# Patient Record
Sex: FEMALE | Race: ASIAN | Employment: FULL TIME | URBAN - METROPOLITAN AREA
[De-identification: names, ages, dates, MRNs, and addresses within clinical notes are randomized per-mention and may not be internally consistent; named-entity substitution may affect disease eponyms.]

---

## 2019-04-08 ENCOUNTER — APPOINTMENT (OUTPATIENT)
Dept: ULTRASOUND IMAGING | Facility: HOSPITAL | Age: 34
End: 2019-04-08
Attending: NURSE PRACTITIONER
Payer: COMMERCIAL

## 2019-04-08 ENCOUNTER — HOSPITAL ENCOUNTER (EMERGENCY)
Facility: HOSPITAL | Age: 34
Discharge: HOME | End: 2019-04-08
Payer: COMMERCIAL

## 2019-04-08 VITALS
RESPIRATION RATE: 17 BRPM | WEIGHT: 160 LBS | DIASTOLIC BLOOD PRESSURE: 76 MMHG | HEIGHT: 63 IN | TEMPERATURE: 99 F | BODY MASS INDEX: 28.35 KG/M2 | SYSTOLIC BLOOD PRESSURE: 135 MMHG | OXYGEN SATURATION: 97 % | HEART RATE: 117 BPM

## 2019-04-08 DIAGNOSIS — K52.9 GASTROENTERITIS: Primary | ICD-10-CM

## 2019-04-08 DIAGNOSIS — O21.9 NAUSEA AND VOMITING IN PREGNANCY: ICD-10-CM

## 2019-04-08 PROCEDURE — 96375 TX/PRO/DX INJ NEW DRUG ADDON: CPT

## 2019-04-08 PROCEDURE — 96376 TX/PRO/DX INJ SAME DRUG ADON: CPT

## 2019-04-08 PROCEDURE — 83690 ASSAY OF LIPASE: CPT | Performed by: NURSE PRACTITIONER

## 2019-04-08 PROCEDURE — 81001 URINALYSIS AUTO W/SCOPE: CPT

## 2019-04-08 PROCEDURE — 80048 BASIC METABOLIC PNL TOTAL CA: CPT

## 2019-04-08 PROCEDURE — 85025 COMPLETE CBC W/AUTO DIFF WBC: CPT

## 2019-04-08 PROCEDURE — 96361 HYDRATE IV INFUSION ADD-ON: CPT

## 2019-04-08 PROCEDURE — 99285 EMERGENCY DEPT VISIT HI MDM: CPT

## 2019-04-08 PROCEDURE — 80076 HEPATIC FUNCTION PANEL: CPT | Performed by: NURSE PRACTITIONER

## 2019-04-08 PROCEDURE — 96374 THER/PROPH/DIAG INJ IV PUSH: CPT

## 2019-04-08 PROCEDURE — 76705 ECHO EXAM OF ABDOMEN: CPT | Performed by: NURSE PRACTITIONER

## 2019-04-08 RX ORDER — METOCLOPRAMIDE 10 MG/1
10 TABLET ORAL 3 TIMES DAILY PRN
Qty: 15 TABLET | Refills: 0 | Status: SHIPPED | OUTPATIENT
Start: 2019-04-08 | End: 2019-04-15

## 2019-04-08 RX ORDER — ONDANSETRON 2 MG/ML
4 INJECTION INTRAMUSCULAR; INTRAVENOUS ONCE
Status: COMPLETED | OUTPATIENT
Start: 2019-04-08 | End: 2019-04-08

## 2019-04-08 RX ORDER — METOCLOPRAMIDE HYDROCHLORIDE 5 MG/ML
10 INJECTION INTRAMUSCULAR; INTRAVENOUS ONCE
Status: COMPLETED | OUTPATIENT
Start: 2019-04-08 | End: 2019-04-08

## 2019-04-08 RX ORDER — DIPHENHYDRAMINE HYDROCHLORIDE 50 MG/ML
25 INJECTION INTRAMUSCULAR; INTRAVENOUS ONCE
Status: COMPLETED | OUTPATIENT
Start: 2019-04-08 | End: 2019-04-08

## 2019-04-08 RX ORDER — ONDANSETRON 4 MG/1
4 TABLET, ORALLY DISINTEGRATING ORAL EVERY 6 HOURS PRN
Qty: 15 TABLET | Refills: 0 | Status: SHIPPED | OUTPATIENT
Start: 2019-04-08 | End: 2019-04-15

## 2019-04-08 NOTE — ED INITIAL ASSESSMENT (HPI)
PT came in for N/V/D since this morning. 12 weeks pregnant, G2, P1. Denies abdominal pain ,bleeding or discharge. RR even and nonlabored, speaking in full sentences, ambulatory with steady gait.

## 2019-04-08 NOTE — ED PROVIDER NOTES
Patient Seen in: [facility] AND [facility] Emergency Department    History   CC: abd pain  HPI: Ruma Nair 35year old female  who presents to the ER c/o n/v/d and upper abd pain today. Unable to tolerate any PO today. 3 episodes of loose stool w/o blood.  Misa Castillo edema  Neck - supple with trachea midline  Resp - Lung sounds clear bilaterally and wob unlabored, good aeration with equal, even expansion bilaterally   CV - RRR  GI - Appears round and flat, BS +x4 quadrants, +RUQ, epigastric, and LUQ tenderness w/o guar DIFFERENTIAL[[id number]]          Abnormal            Final result                 Please view results for these tests on the individual orders.    RAINBOW DRAW BLUE   RAINBOW DRAW LAVENDER   RAINBOW DRAW LIGHT GREEN   RAINBOW DRAW GOLD       [facility]      gall

## 2019-06-04 ENCOUNTER — HOSPITAL ENCOUNTER (OUTPATIENT)
Age: 34
Discharge: HOME | End: 2019-06-04
Attending: FAMILY MEDICINE
Payer: COMMERCIAL

## 2019-06-04 VITALS
WEIGHT: 170 LBS | OXYGEN SATURATION: 100 % | HEART RATE: 120 BPM | TEMPERATURE: 100 F | RESPIRATION RATE: 18 BRPM | HEIGHT: 63 IN | SYSTOLIC BLOOD PRESSURE: 128 MMHG | DIASTOLIC BLOOD PRESSURE: 84 MMHG | BODY MASS INDEX: 30.12 KG/M2

## 2019-06-04 DIAGNOSIS — J02.9 PHARYNGITIS, UNSPECIFIED ETIOLOGY: Primary | ICD-10-CM

## 2019-06-04 PROCEDURE — 99213 OFFICE O/P EST LOW 20 MIN: CPT

## 2019-06-04 PROCEDURE — 87430 STREP A AG IA: CPT

## 2019-06-04 PROCEDURE — 87081 CULTURE SCREEN ONLY: CPT | Performed by: FAMILY MEDICINE

## 2019-06-04 PROCEDURE — 87502 INFLUENZA DNA AMP PROBE: CPT | Performed by: FAMILY MEDICINE

## 2019-06-04 PROCEDURE — 99214 OFFICE O/P EST MOD 30 MIN: CPT

## 2019-06-04 RX ORDER — DOXYLAMINE SUCCINATE AND PYRIDOXINE HYDROCHLORIDE, DELAYED RELEASE TABLETS 10 MG/10 MG 10; 10 MG/1; MG/1
2 TABLET, DELAYED RELEASE ORAL NIGHTLY
COMMUNITY
End: 2021-05-21

## 2019-06-04 RX ORDER — RANITIDINE 150 MG/1
150 TABLET ORAL 2 TIMES DAILY
COMMUNITY
End: 2021-05-21

## 2019-06-04 NOTE — ED INITIAL ASSESSMENT (HPI)
PT is 19 weeks pregnant. Pt states having a sore throat that began last night with a fever. Pt states having difficulty swallowing. Pt states having a fever that staying in the 100s even with tylenol.

## 2019-06-04 NOTE — ED PROVIDER NOTES
Patient Seen in: [address]    History   Patient presents with:  Sore Throat    Stated Complaint: sore throat, fever    HPI  32yo  at 19wga presents to IC with 1-2 days of a sore throat and fever.  Has been taking Tyl in the jaw. No uvula swelling. Posterior oropharyngeal erythema (mild posterior pharyngeal injection) present. No oropharyngeal exudate, posterior oropharyngeal edema or tonsillar abscesses. Tonsils are 0 on the right. Tonsils are 0 on the left.  No tonsill [address]  [phone]    Schedule an appointment as soon as possible for a visit in 3 days  If no improvement or return to clinic for new or worse symptoms        Medications Prescribed:  Current Discharge Medication List

## 2021-05-21 ENCOUNTER — TELEPHONE (OUTPATIENT)
Dept: PHYSICAL THERAPY | Facility: HOSPITAL | Age: 36
End: 2021-05-21

## 2021-05-21 ENCOUNTER — HOSPITAL ENCOUNTER (OUTPATIENT)
Dept: GENERAL RADIOLOGY | Facility: HOSPITAL | Age: 36
Discharge: HOME | End: 2021-05-21
Attending: PHYSICAL MEDICINE & REHABILITATION
Payer: COMMERCIAL

## 2021-05-21 ENCOUNTER — OFFICE VISIT (OUTPATIENT)
Dept: NEUROLOGY | Facility: CLINIC | Age: 36
End: 2021-05-21
Payer: COMMERCIAL

## 2021-05-21 ENCOUNTER — TELEPHONE (OUTPATIENT)
Dept: NEUROLOGY | Facility: CLINIC | Age: 36
End: 2021-05-21

## 2021-05-21 VITALS
OXYGEN SATURATION: 96 % | DIASTOLIC BLOOD PRESSURE: 78 MMHG | WEIGHT: 160 LBS | SYSTOLIC BLOOD PRESSURE: 112 MMHG | HEART RATE: 92 BPM | BODY MASS INDEX: 28.35 KG/M2 | HEIGHT: 63 IN

## 2021-05-21 DIAGNOSIS — M70.61 GREATER TROCHANTERIC BURSITIS OF BOTH HIPS: ICD-10-CM

## 2021-05-21 DIAGNOSIS — M70.62 GREATER TROCHANTERIC BURSITIS OF BOTH HIPS: Primary | ICD-10-CM

## 2021-05-21 DIAGNOSIS — M51.16 LUMBAR DISC HERNIATION WITH RADICULOPATHY: ICD-10-CM

## 2021-05-21 DIAGNOSIS — S76.019A STRAIN OF GLUTEUS MEDIUS, UNSPECIFIED LATERALITY, INITIAL ENCOUNTER: ICD-10-CM

## 2021-05-21 DIAGNOSIS — T39.395A NSAID INDUCED GASTRITIS: ICD-10-CM

## 2021-05-21 DIAGNOSIS — M54.59 MECHANICAL LOW BACK PAIN: ICD-10-CM

## 2021-05-21 DIAGNOSIS — M22.2X9 PATELLOFEMORAL PAIN SYNDROME, UNSPECIFIED LATERALITY: ICD-10-CM

## 2021-05-21 DIAGNOSIS — M76.31 ILIOTIBIAL BAND SYNDROME OF RIGHT SIDE: ICD-10-CM

## 2021-05-21 DIAGNOSIS — M47.816 LUMBAR SPONDYLOSIS: ICD-10-CM

## 2021-05-21 DIAGNOSIS — K29.60 NSAID INDUCED GASTRITIS: ICD-10-CM

## 2021-05-21 DIAGNOSIS — M70.62 GREATER TROCHANTERIC BURSITIS OF BOTH HIPS: ICD-10-CM

## 2021-05-21 DIAGNOSIS — M70.61 GREATER TROCHANTERIC BURSITIS OF BOTH HIPS: Primary | ICD-10-CM

## 2021-05-21 PROCEDURE — 72110 X-RAY EXAM L-2 SPINE 4/>VWS: CPT | Performed by: PHYSICAL MEDICINE & REHABILITATION

## 2021-05-21 PROCEDURE — 73564 X-RAY EXAM KNEE 4 OR MORE: CPT | Performed by: PHYSICAL MEDICINE & REHABILITATION

## 2021-05-21 RX ORDER — SPIRONOLACTONE 50 MG/1
1 TABLET, FILM COATED ORAL DAILY
COMMUNITY
Start: 2021-03-11

## 2021-05-21 NOTE — H&P
[address] H&P    Requesting Physician: Ariana Flores    Chief Complaint (Reason for Visit):  Patient presents with:  Leg Pain: Patient presents today c/o pain on lateral side of right thigh going in file      CURRENT MEDICATIONS:   Current Outpatient Medications   Medication Sig Dispense Refill   • spironolactone 50 MG Oral Tab 1 tablet daily.           ALLERGIES:   No Known Allergies      REVIEW OF SYSTEMS:   Review of Systems   Constitutional: Collette Hamilton toe extension (L5)  Sensation: Intact to light touch in all dermatomes of the lower extremities   Reflexes: 1/4 at L4 and S1  Facet Loading: Negative for pain  Straight leg raise: negative for radicular pain symptoms  Slump test: negative for pain symptoms mild right-sided curvature to the thoracolumbar spine with Kulkarni angle of about 8 degrees. VERTEBRAL BODIES:   Transitional anatomy is noted with only 4 non-rib-bearing lumbar type vertebral bodies present.   Presuming that the last fully formed disc space right gluteal pain, and right lateral hip pain which radiates down the right lateral thigh associated with right knee pain.   I believe she has gluteus medius weakness which may be a result of a lumbar radiculopathy given her also weakness during great toe

## 2021-05-21 NOTE — PATIENT INSTRUCTIONS
1) Get Xr of the lumbar spine and right knee today on your way out  2) Begin formal physical therapy at the [facility] Repeatit [facility].  Try to get in with Gema Kline or Maryam Thomas  3) Take Naprosyn 500 mg 1 tablet twice per day with food for the next two weeks and th

## 2021-05-21 NOTE — TELEPHONE ENCOUNTER
Contacted [employer] online to initiate authorization for Right Knee MRI CPT 44737 to be done at [address] [place] [address].     Status: Approved with order #[id number] valid 5/21/21-6/19/21  All approved cpt codes are 47748, [facility] access hospital8 [facility] 66., [facility], [id number], [id number], *[id number], *[id number]    Patient n

## 2021-06-02 ENCOUNTER — TELEPHONE (OUTPATIENT)
Dept: PHYSICAL THERAPY | Facility: HOSPITAL | Age: 36
End: 2021-06-02

## 2021-06-04 ENCOUNTER — HOSPITAL ENCOUNTER (OUTPATIENT)
Dept: MRI IMAGING | Facility: HOSPITAL | Age: 36
Discharge: HOME | End: 2021-06-04
Attending: PHYSICAL MEDICINE & REHABILITATION
Payer: COMMERCIAL

## 2021-06-04 DIAGNOSIS — M54.59 MECHANICAL LOW BACK PAIN: ICD-10-CM

## 2021-06-04 DIAGNOSIS — S76.019A STRAIN OF GLUTEUS MEDIUS, UNSPECIFIED LATERALITY, INITIAL ENCOUNTER: ICD-10-CM

## 2021-06-04 DIAGNOSIS — M70.62 GREATER TROCHANTERIC BURSITIS OF BOTH HIPS: ICD-10-CM

## 2021-06-04 DIAGNOSIS — M47.816 LUMBAR SPONDYLOSIS: ICD-10-CM

## 2021-06-04 DIAGNOSIS — M51.16 LUMBAR DISC HERNIATION WITH RADICULOPATHY: ICD-10-CM

## 2021-06-04 DIAGNOSIS — T39.395A NSAID INDUCED GASTRITIS: ICD-10-CM

## 2021-06-04 DIAGNOSIS — M70.61 GREATER TROCHANTERIC BURSITIS OF BOTH HIPS: ICD-10-CM

## 2021-06-04 DIAGNOSIS — K29.60 NSAID INDUCED GASTRITIS: ICD-10-CM

## 2021-06-04 DIAGNOSIS — M76.31 ILIOTIBIAL BAND SYNDROME OF RIGHT SIDE: ICD-10-CM

## 2021-06-04 DIAGNOSIS — M22.2X9 PATELLOFEMORAL PAIN SYNDROME, UNSPECIFIED LATERALITY: ICD-10-CM

## 2021-06-04 PROCEDURE — 73721 MRI JNT OF LWR EXTRE W/O DYE: CPT | Performed by: PHYSICAL MEDICINE & REHABILITATION

## 2021-06-08 ENCOUNTER — TELEPHONE (OUTPATIENT)
Dept: PHYSICAL THERAPY | Facility: HOSPITAL | Age: 36
End: 2021-06-08

## 2021-06-10 ENCOUNTER — MED REC SCAN ONLY (OUTPATIENT)
Dept: NEUROLOGY | Facility: CLINIC | Age: 36
End: 2021-06-10

## 2021-06-18 ENCOUNTER — OFFICE VISIT (OUTPATIENT)
Dept: PHYSICAL THERAPY | Facility: HOSPITAL | Age: 36
End: 2021-06-18
Attending: PHYSICAL MEDICINE & REHABILITATION
Payer: COMMERCIAL

## 2021-06-18 DIAGNOSIS — M51.16 LUMBAR DISC HERNIATION WITH RADICULOPATHY: ICD-10-CM

## 2021-06-18 DIAGNOSIS — M54.59 MECHANICAL LOW BACK PAIN: ICD-10-CM

## 2021-06-18 DIAGNOSIS — K29.60 NSAID INDUCED GASTRITIS: ICD-10-CM

## 2021-06-18 DIAGNOSIS — M47.816 LUMBAR SPONDYLOSIS: ICD-10-CM

## 2021-06-18 DIAGNOSIS — M70.62 GREATER TROCHANTERIC BURSITIS OF BOTH HIPS: ICD-10-CM

## 2021-06-18 DIAGNOSIS — S76.019A STRAIN OF GLUTEUS MEDIUS, UNSPECIFIED LATERALITY, INITIAL ENCOUNTER: ICD-10-CM

## 2021-06-18 DIAGNOSIS — M22.2X9 PATELLOFEMORAL PAIN SYNDROME, UNSPECIFIED LATERALITY: ICD-10-CM

## 2021-06-18 DIAGNOSIS — M70.61 GREATER TROCHANTERIC BURSITIS OF BOTH HIPS: ICD-10-CM

## 2021-06-18 DIAGNOSIS — M76.31 ILIOTIBIAL BAND SYNDROME OF RIGHT SIDE: ICD-10-CM

## 2021-06-18 DIAGNOSIS — T39.395A NSAID INDUCED GASTRITIS: ICD-10-CM

## 2021-06-18 PROCEDURE — 97112 NEUROMUSCULAR REEDUCATION: CPT | Performed by: PHYSICAL THERAPIST

## 2021-06-18 PROCEDURE — 97161 PT EVAL LOW COMPLEX 20 MIN: CPT | Performed by: PHYSICAL THERAPIST

## 2021-06-18 NOTE — PROGRESS NOTES
Angelica Nicholson    LUMBAR SPINE EVALUATION:   Referring Physician: Dr. Krystyna Avitia  Diagnosis:    Greater trochanteric bursitis of both hips (M70.61,M70.62)  Strain of gluteus medius, unspecified laterality, initial encounter (S76.019A)  Mechanical low back pain (M54.5)  Lumba No     Have you recently had thoughts of hurting yourself? No    Have you tried to hurt yourself in the past?  No      ASSESSMENT:   Dr. Haris Loredo reports a long hx of R sided hip pain, lateral leg pain and increase R knee pain.   She displays an anterior pelvi glut max   TNE Education    HEP        The pt was educated on the plan of care, purpose and individual goals for therapy, precautions for therapy. All questions were answered.      Charges: PT Eval x 1, NM2    Total Timed treatment: 55 min      Total Treat

## 2021-06-21 ENCOUNTER — TELEPHONE (OUTPATIENT)
Dept: PHYSICAL THERAPY | Facility: HOSPITAL | Age: 36
End: 2021-06-21

## 2021-06-25 ENCOUNTER — APPOINTMENT (OUTPATIENT)
Dept: PHYSICAL THERAPY | Facility: HOSPITAL | Age: 36
End: 2021-06-25
Attending: PHYSICAL MEDICINE & REHABILITATION
Payer: COMMERCIAL

## 2021-07-02 ENCOUNTER — OFFICE VISIT (OUTPATIENT)
Dept: PHYSICAL THERAPY | Facility: HOSPITAL | Age: 36
End: 2021-07-02
Attending: PHYSICAL MEDICINE & REHABILITATION
Payer: COMMERCIAL

## 2021-07-02 PROCEDURE — 97140 MANUAL THERAPY 1/> REGIONS: CPT | Performed by: PHYSICAL THERAPIST

## 2021-07-02 PROCEDURE — 97112 NEUROMUSCULAR REEDUCATION: CPT | Performed by: PHYSICAL THERAPIST

## 2021-07-02 NOTE — PROGRESS NOTES
7/2/2021  Dx:        Diagnosis:    Greater trochanteric bursitis of both hips (M70.61,M70.62)  Strain of gluteus medius, unspecified laterality, initial encounter (S76.019A)  Mechanical low back pain (M54.5)  Lumbar spondylosis (M47.816)  Lumbar disc herni in their HEP. 2.  Centralization of symptoms to the lumbar spine. 3.  The pt will be independent in a modified workout program.  4.  The pt will be able to complete a full work day without an increase in pain.   5.  The pt will be able to sleep through th intense, she will wake up at night. Standing is better. Using a standing desk at work. Sitting and lying are worse. Current functional limitations include limited ability to tolerate sitting, lying down, sleeping, unable to tolerate work outs.    Kit 5    5    Knee Extension (L3) 5 4    Knee Flexion 5 4+    Ankle DF (L4) 5 5    EHL (L5) 5 4    Ankle PF (S1) 5 5    Hip Abduction NT NT    Hip Extension NT NT      Flexibility:      R L   Hamstrings short short   Piriformis [facility] WFL   Hip Flexor long long

## 2021-07-09 ENCOUNTER — APPOINTMENT (OUTPATIENT)
Dept: PHYSICAL THERAPY | Facility: HOSPITAL | Age: 36
End: 2021-07-09
Attending: PHYSICAL MEDICINE & REHABILITATION
Payer: COMMERCIAL

## 2021-07-13 ENCOUNTER — OFFICE VISIT (OUTPATIENT)
Dept: PHYSICAL THERAPY | Facility: HOSPITAL | Age: 36
End: 2021-07-13
Attending: PHYSICAL MEDICINE & REHABILITATION
Payer: COMMERCIAL

## 2021-07-13 PROCEDURE — 97110 THERAPEUTIC EXERCISES: CPT | Performed by: PHYSICAL THERAPIST

## 2021-07-13 PROCEDURE — 97112 NEUROMUSCULAR REEDUCATION: CPT | Performed by: PHYSICAL THERAPIST

## 2021-07-13 PROCEDURE — 97140 MANUAL THERAPY 1/> REGIONS: CPT | Performed by: PHYSICAL THERAPIST

## 2021-07-13 NOTE — PROGRESS NOTES
7/13/2021  Dx:        Diagnosis:    Greater trochanteric bursitis of both hips (M70.61,M70.62)  Strain of gluteus medius, unspecified laterality, initial encounter (S76.019A)  Mechanical low back pain (M54.5)  Lumbar spondylosis (M47.816)  Lumbar disc shirlene SAQ's.  Given an updated HEP. Goals: The pt was educated on the plan of care, purpose and individual goals for therapy, precautions for therapy. All questions were answered. 1.  The pt will be independent in their HEP.   2.  Centralization of s help.     No formal workouts currently. Kids are 3 y/o, 3 y/o. Wearing loafer type shoe which is helping. All pain is on the R side. 1-2/10 VAS. Feels tight all the time. 8-9/10 VAS when flared up. When pain is intense, she will wake up at night. decreased   Adduction Drop Test + +   Extension Drop Test NT NT   SLR 80 80   Trunk Rotation NT NT   PART + +   PADT - -   Hrusta Adduction Lift Test 2- 2-     STRENGTH:   5/5 MMT Scale   Left  Right Comments   Hip Flexion (L2) 5    5    Knee Extension (L3

## 2021-07-16 ENCOUNTER — APPOINTMENT (OUTPATIENT)
Dept: PHYSICAL THERAPY | Facility: HOSPITAL | Age: 36
End: 2021-07-16
Attending: PHYSICAL MEDICINE & REHABILITATION
Payer: COMMERCIAL

## 2021-07-20 ENCOUNTER — OFFICE VISIT (OUTPATIENT)
Dept: PHYSICAL THERAPY | Facility: HOSPITAL | Age: 36
End: 2021-07-20
Attending: PHYSICAL MEDICINE & REHABILITATION
Payer: COMMERCIAL

## 2021-07-20 PROCEDURE — 97112 NEUROMUSCULAR REEDUCATION: CPT | Performed by: PHYSICAL THERAPIST

## 2021-07-20 PROCEDURE — 97110 THERAPEUTIC EXERCISES: CPT | Performed by: PHYSICAL THERAPIST

## 2021-07-20 PROCEDURE — 97140 MANUAL THERAPY 1/> REGIONS: CPT | Performed by: PHYSICAL THERAPIST

## 2021-07-20 NOTE — PROGRESS NOTES
7/20/2021    Dx:        Diagnosis:    Greater trochanteric bursitis of both hips (M70.61,M70.62)  Strain of gluteus medius, unspecified laterality, initial encounter (S76.019A)  Mechanical low back pain (M54.5)  Lumbar spondylosis (M47.816)  Lumbar disc he up.    Goals: The pt was educated on the plan of care, purpose and individual goals for therapy, precautions for therapy. All questions were answered. 1.  The pt will be independent in their HEP.   2.  Centralization of symptoms to the lumbar spin currently. Kids are 3 y/o, 3 y/o. Wearing loafer type shoe which is helping. All pain is on the R side. 1-2/10 VAS. Feels tight all the time. 8-9/10 VAS when flared up. When pain is intense, she will wake up at night. Standing is better.   Using a Extension Drop Test NT NT   SLR 80 80   Trunk Rotation NT NT   PART + +   PADT - -   Hrusta Adduction Lift Test 2- 2-     STRENGTH:   5/5 MMT Scale   Left  Right Comments   Hip Flexion (L2) 5    5    Knee Extension (L3) 5 4    Knee Flexion 5 4+    Ankle

## 2021-07-27 ENCOUNTER — OFFICE VISIT (OUTPATIENT)
Dept: PHYSICAL THERAPY | Facility: HOSPITAL | Age: 36
End: 2021-07-27
Attending: PHYSICAL MEDICINE & REHABILITATION
Payer: COMMERCIAL

## 2021-07-27 PROCEDURE — 97112 NEUROMUSCULAR REEDUCATION: CPT | Performed by: PHYSICAL THERAPIST

## 2021-07-27 PROCEDURE — 97140 MANUAL THERAPY 1/> REGIONS: CPT | Performed by: PHYSICAL THERAPIST

## 2021-07-27 NOTE — PROGRESS NOTES
7/27/2021  Dx:        Diagnosis:    Greater trochanteric bursitis of both hips (M70.61,M70.62)  Strain of gluteus medius, unspecified laterality, initial encounter (S76.019A)  Mechanical low back pain (M54.5)  Lumbar spondylosis (M47.816)  Lumbar disc shirlene very TTP of the LCL and medial and lateral joint lines. Continues to be able to reach full extension but lacks hyperextension R. Advised to continue her current HEP. Goals:       The pt was educated on the plan of care, purpose and individual goals for she has had an recent increase in R knee pain and reports some fluid was seen on the MRI. States a knee brace. heat and NSAIDs help. No formal workouts currently. Kids are 3 y/o, 3 y/o. Wearing loafer type shoe which is helping.      All pain is on th Rotation NT    L Rotation NT    R Sideglide WFL    L Sideglide WFL        LUIS ALFREDO Testing R L   Apical Expansion decreased decreased   Adduction Drop Test + +   Extension Drop Test NT NT   SLR 80 80   Trunk Rotation NT NT   PART + +   PADT - -   Hrusta Adducti

## 2021-07-30 ENCOUNTER — APPOINTMENT (OUTPATIENT)
Dept: PHYSICAL THERAPY | Facility: HOSPITAL | Age: 36
End: 2021-07-30
Attending: PHYSICAL MEDICINE & REHABILITATION
Payer: COMMERCIAL

## 2021-08-03 ENCOUNTER — OFFICE VISIT (OUTPATIENT)
Dept: PHYSICAL THERAPY | Facility: HOSPITAL | Age: 36
End: 2021-08-03
Attending: PHYSICAL MEDICINE & REHABILITATION
Payer: COMMERCIAL

## 2021-08-03 PROCEDURE — 97140 MANUAL THERAPY 1/> REGIONS: CPT | Performed by: PHYSICAL THERAPIST

## 2021-08-03 PROCEDURE — 97110 THERAPEUTIC EXERCISES: CPT | Performed by: PHYSICAL THERAPIST

## 2021-08-04 NOTE — PROGRESS NOTES
8/3/2021  Dx:        Diagnosis:    Greater trochanteric bursitis of both hips (M70.61,M70.62)  Strain of gluteus medius, unspecified laterality, initial encounter (S76.019A)  Mechanical low back pain (M54.5)  Lumbar spondylosis (M47.816)  Lumbar disc herni shift to the R  [facility] TKE  1. With ball  2. With Red Tband    Wall squats at 45 degrees with holds    3 way hip standing on the R LE  3 position SLR    SLR in sitting         Assessment: No adverse effects to treatment.   Continues to have significant pain Bertin Bridges is a 28year old y/o female who presents to therapy today with complaints of R lateral leg pain for the last 10 years. States she is unable to tolerate daily activities and workouts because of the continued pain.   Reports she has had an recent increa knee hyperextension  Gait: decreased ability to achieve authentic L or R stance  ROM:     Trunk         Pain (+/-)   Flexion WFL                      Extension Decreased 25% At L4L5 and L5S1   R Sidebend WFL    L Sidebend WFL    R Rotation NT    L Rotation

## 2021-08-06 ENCOUNTER — APPOINTMENT (OUTPATIENT)
Dept: PHYSICAL THERAPY | Facility: HOSPITAL | Age: 36
End: 2021-08-06
Attending: PHYSICAL MEDICINE & REHABILITATION
Payer: COMMERCIAL

## 2021-08-24 ENCOUNTER — OFFICE VISIT (OUTPATIENT)
Dept: PHYSICAL THERAPY | Facility: HOSPITAL | Age: 36
End: 2021-08-24
Attending: PHYSICAL MEDICINE & REHABILITATION
Payer: COMMERCIAL

## 2021-08-24 PROCEDURE — 97140 MANUAL THERAPY 1/> REGIONS: CPT | Performed by: PHYSICAL THERAPIST

## 2021-08-24 PROCEDURE — 97110 THERAPEUTIC EXERCISES: CPT | Performed by: PHYSICAL THERAPIST

## 2021-08-24 NOTE — PROGRESS NOTES
8/24/2021    Dx:        Diagnosis:    Greater trochanteric bursitis of both hips (M70.61,M70.62)  Strain of gluteus medius, unspecified laterality, initial encounter (S76.019A)  Mechanical low back pain (M54.5)  Lumbar spondylosis (M47.816)  Lumbar disc he adverse effects to treatment. The pt returned to therapy after a 3 week break with increased symptoms. She had lost her full ability to extend/hyperextend her R knee. She was TTP of the medial joint line, VMO and pes anserinus areas.   Taped for edema and 2021    PATIENT SUMMARY   Bandar Mcfadden is a 28year old y/o female who presents to therapy today with complaints of R lateral leg pain for the last 10 years. States she is unable to tolerate daily activities and workouts because of the continued pain.   Re lacks full R knee extension, L knee hyperextension  Gait: decreased ability to achieve authentic L or R stance  ROM:     Trunk         Pain (+/-)   Flexion WFL                      Extension Decreased 25% At L4L5 and L5S1   R Sidebend WFL    L [facility]

## 2021-08-31 ENCOUNTER — APPOINTMENT (OUTPATIENT)
Dept: PHYSICAL THERAPY | Facility: HOSPITAL | Age: 36
End: 2021-08-31
Attending: PHYSICAL MEDICINE & REHABILITATION
Payer: COMMERCIAL

## 2021-09-14 ENCOUNTER — APPOINTMENT (OUTPATIENT)
Dept: PHYSICAL THERAPY | Facility: HOSPITAL | Age: 36
End: 2021-09-14
Attending: PHYSICAL MEDICINE & REHABILITATION
Payer: COMMERCIAL

## 2021-09-21 ENCOUNTER — OFFICE VISIT (OUTPATIENT)
Dept: PHYSICAL THERAPY | Facility: HOSPITAL | Age: 36
End: 2021-09-21
Attending: PHYSICAL MEDICINE & REHABILITATION
Payer: COMMERCIAL

## 2021-09-21 PROCEDURE — 97112 NEUROMUSCULAR REEDUCATION: CPT | Performed by: PHYSICAL THERAPIST

## 2021-09-21 PROCEDURE — 97140 MANUAL THERAPY 1/> REGIONS: CPT | Performed by: PHYSICAL THERAPIST

## 2021-09-22 NOTE — PROGRESS NOTES
9/21/2021  Dx:        Diagnosis:    Greater trochanteric bursitis of both hips (M70.61,M70.62)  Strain of gluteus medius, unspecified laterality, initial encounter (S76.019A)  Mechanical low back pain (M54.5)  Lumbar spondylosis (M47.816)  Lumbar disc shirlene this date. She was TTP of the lateral joint line but responded well to manual therapy. Given R glut max activation exercise to help decrease IT band irritation and improve frontal plane control. Goals:       The pt was educated on the plan of care, pur workouts because of the continued pain. Reports she has had an recent increase in R knee pain and reports some fluid was seen on the MRI. States a knee brace. heat and NSAIDs help. No formal workouts currently. Kids are 3 y/o, 3 y/o.   Wearing loafer and L5S1   R Sidebend WFL    L Sidebend WFL    R Rotation NT    L Rotation NT    R Sideglide WFL    L Sideglide WFL        LUIS ALFREDO Testing R L   Apical Expansion decreased decreased   Adduction Drop Test + +   Extension Drop Test NT NT   SLR 80 80   Trunk [facility]

## 2021-09-28 ENCOUNTER — APPOINTMENT (OUTPATIENT)
Dept: PHYSICAL THERAPY | Facility: HOSPITAL | Age: 36
End: 2021-09-28
Attending: PHYSICAL MEDICINE & REHABILITATION
Payer: COMMERCIAL

## 2021-10-05 ENCOUNTER — OFFICE VISIT (OUTPATIENT)
Dept: PHYSICAL THERAPY | Facility: HOSPITAL | Age: 36
End: 2021-10-05
Attending: PHYSICAL MEDICINE & REHABILITATION
Payer: COMMERCIAL

## 2021-10-05 PROCEDURE — 97140 MANUAL THERAPY 1/> REGIONS: CPT | Performed by: PHYSICAL THERAPIST

## 2021-10-05 NOTE — PROGRESS NOTES
10/5/2021  Dx:        Diagnosis:    Greater trochanteric bursitis of both hips (M70.61,M70.62)  Strain of gluteus medius, unspecified laterality, initial encounter (S76.019A)  Mechanical low back pain (M54.5)  Lumbar spondylosis (M47.816)  Lumbar disc shirlene The pt will be independent in a modified workout program.  4.  The pt will be able to complete a full work day without an increase in pain. 5.  The pt will be able to sleep through the night without waking up from pain.       Frequency / Duration: Patient work.  Sitting and lying are worse. Current functional limitations include limited ability to tolerate sitting, lying down, sleeping, unable to tolerate work outs. Kittu describes prior level of function independent in all activities.  Pt goals inclu (L5) 5 4    Ankle PF (S1) 5 5    Hip Abduction NT NT    Hip Extension NT NT      Flexibility:      R L   Hamstrings short short   Piriformis WFL WFL   Hip Flexor long long   TFL short WFL       Palpation: TTP over her R ITB  Neuro Screen: (-) toe walking a

## 2021-10-19 ENCOUNTER — OFFICE VISIT (OUTPATIENT)
Dept: PHYSICAL THERAPY | Facility: HOSPITAL | Age: 36
End: 2021-10-19
Attending: PHYSICAL MEDICINE & REHABILITATION
Payer: COMMERCIAL

## 2021-10-19 PROCEDURE — 97140 MANUAL THERAPY 1/> REGIONS: CPT | Performed by: PHYSICAL THERAPIST

## 2021-10-19 PROCEDURE — 97112 NEUROMUSCULAR REEDUCATION: CPT | Performed by: PHYSICAL THERAPIST

## 2021-10-19 NOTE — PROGRESS NOTES
10/19/2021  Dx:        Diagnosis:    Greater trochanteric bursitis of both hips (M70.61,M70.62)  Strain of gluteus medius, unspecified laterality, initial encounter (S76.019A)  Mechanical low back pain (M54.5)  Lumbar spondylosis (M47.816)  Lumbar disc her positioning but was able to elongate the Tspine and engage her IO/TA with cueing. Given an updated HEP. Goals: The pt was educated on the plan of care, purpose and individual goals for therapy, precautions for therapy.   All questions were answered some fluid was seen on the MRI. States a knee brace. heat and NSAIDs help. No formal workouts currently. Kids are 3 y/o, 3 y/o. Wearing loafer type shoe which is helping. All pain is on the R side. 1-2/10 VAS. Feels tight all the time.   8-9/10 Sideglide WFL        LUIS ALFREDO Testing R L   Apical Expansion decreased decreased   Adduction Drop Test + +   Extension Drop Test NT NT   SLR 80 80   Trunk Rotation NT NT   PART + +   PADT - -   Hrusta Adduction Lift Test 2- 2-     STRENGTH:   5/5 MMT Scale   Le

## 2021-10-26 ENCOUNTER — APPOINTMENT (OUTPATIENT)
Dept: PHYSICAL THERAPY | Facility: HOSPITAL | Age: 36
End: 2021-10-26
Attending: PHYSICAL MEDICINE & REHABILITATION
Payer: COMMERCIAL

## 2021-11-09 ENCOUNTER — OFFICE VISIT (OUTPATIENT)
Dept: PHYSICAL THERAPY | Facility: HOSPITAL | Age: 36
End: 2021-11-09
Attending: PHYSICAL MEDICINE & REHABILITATION
Payer: COMMERCIAL

## 2021-11-09 PROCEDURE — 97140 MANUAL THERAPY 1/> REGIONS: CPT | Performed by: PHYSICAL THERAPIST

## 2021-11-09 PROCEDURE — 97110 THERAPEUTIC EXERCISES: CPT | Performed by: PHYSICAL THERAPIST

## 2021-11-09 NOTE — PROGRESS NOTES
11/9/2021  Dx:        Diagnosis:    Greater trochanteric bursitis of both hips (M70.61,M70.62)  Strain of gluteus medius, unspecified laterality, initial encounter (S76.019A)  Mechanical low back pain (M54.5)  Lumbar spondylosis (M47.816)  Lumbar disc shirlene No adverse effects to treatment. The pt need further body stability through her LE's, core and scapular retractors. Given exercises to address these areas. Improved thoracic flexion after Kaltenborn wedge technique. Goals:       The pt was educated on daily activities and workouts because of the continued pain. Reports she has had an recent increase in R knee pain and reports some fluid was seen on the MRI. States a knee brace. heat and NSAIDs help. No formal workouts currently.   Kids are 1 y/o, 4 Decreased 25% At L4L5 and L5S1   R Sidebend WFL    L Sidebend WFL    R Rotation NT    L Rotation NT    R Sideglide WFL    L Sideglide WFL        LUIS ALFREDO Testing R L   Apical Expansion decreased decreased   Adduction Drop Test + +   Extension Drop Test NT NT

## 2021-11-30 ENCOUNTER — OFFICE VISIT (OUTPATIENT)
Dept: PHYSICAL THERAPY | Facility: HOSPITAL | Age: 36
End: 2021-11-30
Attending: PHYSICAL MEDICINE & REHABILITATION
Payer: COMMERCIAL

## 2021-11-30 PROCEDURE — 97140 MANUAL THERAPY 1/> REGIONS: CPT | Performed by: PHYSICAL THERAPIST

## 2021-11-30 PROCEDURE — 97110 THERAPEUTIC EXERCISES: CPT | Performed by: PHYSICAL THERAPIST

## 2021-12-01 NOTE — PROGRESS NOTES
11/30/2021  Patient Name: William Elam  YOB: 1985          MRN number:  [id number]  Referring Physician:  Dr. Caitlin Loomis has attended 15, cancelled 1, and no shown 0 visits in Physical Therapy    Dx:        Diagnosis: Therapeutic Activity       Neuromuscular Education  3 plane sagittal tilts  1. With serratus punch  2. With arm raise OH    Lat dorsi inhibition using door frame    PME expansion in sitting     TNE Education       HEP   Planks  1.  1/2  2.   Attempted f to actively participate in planning and for this course of care. Thank you for your referral. Please co-sign or sign and return this letter via fax as soon as possible to [phone]. If you have any questions, please contact me at Dept: [phone]. worse.      Current functional limitations include limited ability to tolerate sitting, lying down, sleeping, unable to tolerate work outs. Kittu describes prior level of function independent in all activities.  Pt goals include decrease pain, decrease ti 5    Hip Abduction NT NT    Hip Extension NT NT      Flexibility:      R L   Hamstrings short short   Piriformis WFL WFL   Hip Flexor long long   TFL short WFL       Palpation: TTP over her R ITB  Neuro Screen: (-) toe walking and heel walking    Outcome S

## 2021-12-14 ENCOUNTER — APPOINTMENT (OUTPATIENT)
Dept: PHYSICAL THERAPY | Facility: HOSPITAL | Age: 36
End: 2021-12-14
Attending: PHYSICAL MEDICINE & REHABILITATION
Payer: COMMERCIAL

## 2022-02-02 ENCOUNTER — ORDER TRANSCRIPTION (OUTPATIENT)
Dept: PHYSICAL THERAPY | Facility: HOSPITAL | Age: 37
End: 2022-02-02

## 2022-02-03 ENCOUNTER — HOSPITAL ENCOUNTER (OUTPATIENT)
Dept: ULTRASOUND IMAGING | Facility: HOSPITAL | Age: 37
Discharge: HOME | End: 2022-02-03
Attending: OBSTETRICS & GYNECOLOGY
Payer: COMMERCIAL

## 2022-02-03 DIAGNOSIS — R10.2 PELVIC PRESSURE IN FEMALE: ICD-10-CM

## 2022-02-03 PROCEDURE — 76856 US EXAM PELVIC COMPLETE: CPT | Performed by: OBSTETRICS & GYNECOLOGY

## 2022-02-03 PROCEDURE — 76830 TRANSVAGINAL US NON-OB: CPT | Performed by: OBSTETRICS & GYNECOLOGY

## 2022-02-07 ENCOUNTER — OFFICE VISIT (OUTPATIENT)
Dept: PHYSICAL THERAPY | Facility: HOSPITAL | Age: 37
End: 2022-02-07
Attending: PHYSICAL MEDICINE & REHABILITATION
Payer: COMMERCIAL

## 2022-02-07 DIAGNOSIS — R42 VERTIGO: ICD-10-CM

## 2022-02-07 PROCEDURE — 97161 PT EVAL LOW COMPLEX 20 MIN: CPT

## 2022-02-07 PROCEDURE — 95992 CANALITH REPOSITIONING PROC: CPT

## 2022-02-07 NOTE — PROGRESS NOTES
VESTIBULAR EVALUATION:   Referring Physician: Zayda Poe  Diagnosis: Dizziness, BPPV     Date of Service: 2/7/2022   Insurance:  [employer] [facility]      PATIENT SUMMARY   Chen Lewis is a 39year old female who presents to therapy today with reports of positional dizziness. History/onset of current condition:  Pt. Was in her usual state of health 3 weeks ago, when she woke up with spinning dizziness, nausea, presyncopal symptoms. Since that time, she has had intermittent dizziness with position changes, has been limiting movement and function to avoid positions that make her dizzy. Pt. Has done self-repositioning with some relief. Symptoms with cough/sneeze or loud noise: No  Falls: No  Hx of migraines: No  Hx of vision issue:  PRK bilaterally. Hx of hearing issues: none    Dizziness: Current 0/10, Best 0/10, Worst 4/10  Quality: spinning  Frequency/Duration:  Brief in duration, nearly daily if exacerbated  Aggravates: Supine to/from sit, Sit to stand, Rolling, Bending over, Quick head movements and Looking/reaching up. Seems to be worst on right  Relieves: Not moving and Sitting down    Current functional limitations include difficulty with nodding, picking up objects from the floor, unable to lie flat or roll in bed. 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 Lives with  (radiologist at [employer]) and 2 children. Prior level of function: no limitations. Pt goals include Decrease dizziness  Past medical history was reviewed. Significant findings include  has no past medical history on file. ASSESSMENT  Chen Lewis presents to physical therapy evaluation with primary c/o positional dizziness. The results of the objective tests and measures show mildly positive for right posterior canal BPPV. Pt. Was treated today with canalith repositioning for right posterior canal,  =with good tolerance, no nausea.   Functional deficits include but are not limited to unable to lie flat or roll over in bed, difficulty picking up objects from floor, vertical head movements. PT discussed evaluation findings, pathology and management of BPPV, plan of care with pt. Verbally reviewed self-repositioning with pt. Skilled Physical Therapy is medically necessary to address the above impairments and reach functional goals. Precautions:  None  OBJECTIVE:   Physical Exam:  Posture/Observation: good posture, no assistive device   Neuro Screen: Sensation: WNL all extremities     Coordination Testing:   Finger to Nose: WNL   Pronation/supination: WNL   Toe tapping: WNL     Cervical spine ROM: WNL  Adverse neuro signs with ROM: no     Oculomotor & Vestibular Exam:  Spontaneous Nystagmus: room light: none ;  fixation blocked: none  Smooth Pursuit: Negative  Gaze Evoked Nystagmus:  room light: Negative; fixation blocked: Negative  Head Thrust: Negative  VOR screen:  WNL no dizziness    VOR Cancellation: Negative   Head Shaking Nystagmus: Negative  Dynamic Visual Acuity:  Not Tested    Positional Testing:   Rafaela-Hallpike: R positive right torsional upbeating nystagmus, very mild, 10 sec duration 5 sec latency, + symptoms, L negative, no symptoms   Roll Test [facility]): NT  Canalith repositioning maneuver for right posterior canal BPPV x  1. Discussed rationale for self-repositioning, recommended only as needed every 2 days. Pt. Reports that she had been performing in the same way as she experienced today. Postural Control:   TBA as appropriate    Functional Mobility:   Gait: pt ambulates on level ground with normal mechanics. Functional Gait Assessment (FGA): TBA as appropriate   Five Times Sit to Stand Test: TBA as appropriate     Today's Treatment and Response:   Pt education was provided on exam findings, treatment diagnosis, treatment plan, expectations, and prognosis.  Pt was also provided recommendations for cervical cold pack as needed for nausea, detrimental fear avoidance behaviors, importance of remaining active and possible dizziness after evaluation. Charges: PT Chrissy Low Complexity, CRM      Total Timed Treatment: 40 min     Total Treatment Time: 40 min     PLAN OF CARE:    Goals: (to be met in 8 visits)  1. Negative Langford-Hallpike and roll tests. 2.  Able to perform position changes such as supine to/from sit and bending over to the floor without dizziness to improve safety in functional tasks. 3.  Pt. Able to ambulate with horizontal head turns for visual scanning without path deviation or dizziness to improve safety during gait. Frequency / Duration: Patient will be seen for 1-2 x/week or a total of 8 visits over a 90 day period. Treatment will include: home exercise program development and instruction, patient/family education, balance training, canalith repositioning maneuver and eye/head coordination exercises. Education or treatment limitation: None   Rehab Potential: good     Patient was advised of these findings, precautions, and treatment options and has agreed to actively participate in planning and for this course of care. Thank you for your referral. Please co-sign or sign and return this letter via fax as soon as possible to [phone]. If you have any questions, please contact me at Dept: [phone]. Sincerely,    Jodie Maki PT, NCS    Electronically signed by therapist: Jodie Maki PT, NCS  [de-identified] certification required: Yes  I certify the need for these services furnished under this plan of treatment and while under my care.     X___________________________________________________ Date____________________    Certification From: 5/1/2761  To:5/8/2022

## 2022-04-06 ENCOUNTER — OFFICE VISIT (OUTPATIENT)
Dept: PHYSICAL MEDICINE AND REHAB | Facility: CLINIC | Age: 37
End: 2022-04-06
Payer: COMMERCIAL

## 2022-04-06 VITALS
BODY MASS INDEX: 24.45 KG/M2 | HEIGHT: 63 IN | DIASTOLIC BLOOD PRESSURE: 80 MMHG | OXYGEN SATURATION: 100 % | SYSTOLIC BLOOD PRESSURE: 100 MMHG | HEART RATE: 102 BPM | WEIGHT: 138 LBS

## 2022-04-06 DIAGNOSIS — M25.512 CHRONIC LEFT SHOULDER PAIN: ICD-10-CM

## 2022-04-06 DIAGNOSIS — M75.42 IMPINGEMENT SYNDROME OF LEFT SHOULDER: ICD-10-CM

## 2022-04-06 DIAGNOSIS — M54.2 TRIGGER POINT OF NECK: ICD-10-CM

## 2022-04-06 DIAGNOSIS — M99.9 BIOMECHANICAL LESION: Primary | ICD-10-CM

## 2022-04-06 DIAGNOSIS — M67.919 TENDINOPATHY OF ROTATOR CUFF, UNSPECIFIED LATERALITY: ICD-10-CM

## 2022-04-06 DIAGNOSIS — M79.10 MYALGIA: ICD-10-CM

## 2022-04-06 DIAGNOSIS — M75.52 SUBACROMIAL BURSITIS OF LEFT SHOULDER JOINT: ICD-10-CM

## 2022-04-06 DIAGNOSIS — G89.29 CHRONIC LEFT SHOULDER PAIN: ICD-10-CM

## 2022-04-06 PROCEDURE — 99214 OFFICE O/P EST MOD 30 MIN: CPT | Performed by: PHYSICAL MEDICINE & REHABILITATION

## 2022-04-06 PROCEDURE — 3079F DIAST BP 80-89 MM HG: CPT | Performed by: PHYSICAL MEDICINE & REHABILITATION

## 2022-04-06 PROCEDURE — 3074F SYST BP LT 130 MM HG: CPT | Performed by: PHYSICAL MEDICINE & REHABILITATION

## 2022-04-06 PROCEDURE — 3008F BODY MASS INDEX DOCD: CPT | Performed by: PHYSICAL MEDICINE & REHABILITATION

## 2022-04-06 NOTE — PATIENT INSTRUCTIONS
1) Start PT as soon as possible  2) Continue Naprosyn as needed for pain  3) If no improvement after 4 weeks of therapy then would recommend subacromial CSI

## 2022-04-25 ENCOUNTER — TELEPHONE (OUTPATIENT)
Dept: PHYSICAL THERAPY | Facility: HOSPITAL | Age: 37
End: 2022-04-25

## 2022-04-26 ENCOUNTER — OFFICE VISIT (OUTPATIENT)
Dept: PHYSICAL THERAPY | Facility: HOSPITAL | Age: 37
End: 2022-04-26
Attending: PHYSICAL MEDICINE & REHABILITATION
Payer: COMMERCIAL

## 2022-04-26 DIAGNOSIS — M54.2 TRIGGER POINT OF NECK: ICD-10-CM

## 2022-04-26 DIAGNOSIS — M75.42 IMPINGEMENT SYNDROME OF LEFT SHOULDER: ICD-10-CM

## 2022-04-26 DIAGNOSIS — M79.10 MYALGIA: ICD-10-CM

## 2022-04-26 DIAGNOSIS — M75.52 SUBACROMIAL BURSITIS OF LEFT SHOULDER JOINT: ICD-10-CM

## 2022-04-26 DIAGNOSIS — G89.29 CHRONIC LEFT SHOULDER PAIN: ICD-10-CM

## 2022-04-26 DIAGNOSIS — M99.9 BIOMECHANICAL LESION: ICD-10-CM

## 2022-04-26 DIAGNOSIS — M67.919 TENDINOPATHY OF ROTATOR CUFF, UNSPECIFIED LATERALITY: ICD-10-CM

## 2022-04-26 DIAGNOSIS — M25.512 CHRONIC LEFT SHOULDER PAIN: ICD-10-CM

## 2022-04-26 PROCEDURE — 97161 PT EVAL LOW COMPLEX 20 MIN: CPT | Performed by: PHYSICAL THERAPIST

## 2022-04-26 PROCEDURE — 97140 MANUAL THERAPY 1/> REGIONS: CPT | Performed by: PHYSICAL THERAPIST

## 2022-04-26 PROCEDURE — 97110 THERAPEUTIC EXERCISES: CPT | Performed by: PHYSICAL THERAPIST

## 2022-04-28 ENCOUNTER — TELEPHONE (OUTPATIENT)
Dept: PHYSICAL THERAPY | Facility: HOSPITAL | Age: 37
End: 2022-04-28

## 2022-05-10 ENCOUNTER — OFFICE VISIT (OUTPATIENT)
Dept: PHYSICAL THERAPY | Facility: HOSPITAL | Age: 37
End: 2022-05-10
Attending: PHYSICAL MEDICINE & REHABILITATION
Payer: COMMERCIAL

## 2022-05-10 PROCEDURE — 97140 MANUAL THERAPY 1/> REGIONS: CPT | Performed by: PHYSICAL THERAPIST

## 2022-05-10 PROCEDURE — 97110 THERAPEUTIC EXERCISES: CPT | Performed by: PHYSICAL THERAPIST

## 2022-05-20 ENCOUNTER — APPOINTMENT (OUTPATIENT)
Dept: PHYSICAL THERAPY | Facility: HOSPITAL | Age: 37
End: 2022-05-20
Attending: PHYSICAL MEDICINE & REHABILITATION
Payer: COMMERCIAL

## 2022-05-31 ENCOUNTER — APPOINTMENT (OUTPATIENT)
Dept: PHYSICAL THERAPY | Facility: HOSPITAL | Age: 37
End: 2022-05-31
Attending: PHYSICAL MEDICINE & REHABILITATION
Payer: COMMERCIAL

## 2022-06-07 ENCOUNTER — APPOINTMENT (OUTPATIENT)
Dept: PHYSICAL THERAPY | Facility: HOSPITAL | Age: 37
End: 2022-06-07
Attending: PHYSICAL MEDICINE & REHABILITATION
Payer: COMMERCIAL

## 2022-06-07 ENCOUNTER — TELEPHONE (OUTPATIENT)
Dept: PHYSICAL THERAPY | Facility: HOSPITAL | Age: 37
End: 2022-06-07

## 2022-06-07 NOTE — TELEPHONE ENCOUNTER
The pt reported that she is recovered from Covid (past 10 days) but still having coughing fits. States she started coughing on a way to therapy and didn't think she could make it in, thererfore appointment was cancelled.

## 2022-06-09 ENCOUNTER — OFFICE VISIT (OUTPATIENT)
Dept: PHYSICAL THERAPY | Facility: HOSPITAL | Age: 37
End: 2022-06-09
Attending: PHYSICAL MEDICINE & REHABILITATION
Payer: COMMERCIAL

## 2022-06-09 PROCEDURE — 97140 MANUAL THERAPY 1/> REGIONS: CPT | Performed by: PHYSICAL THERAPIST

## 2022-06-22 ENCOUNTER — TELEPHONE (OUTPATIENT)
Dept: PHYSICAL THERAPY | Facility: HOSPITAL | Age: 37
End: 2022-06-22

## 2022-06-28 ENCOUNTER — OFFICE VISIT (OUTPATIENT)
Dept: PHYSICAL THERAPY | Facility: HOSPITAL | Age: 37
End: 2022-06-28
Attending: PHYSICAL MEDICINE & REHABILITATION
Payer: COMMERCIAL

## 2022-06-28 PROCEDURE — 97140 MANUAL THERAPY 1/> REGIONS: CPT | Performed by: PHYSICAL THERAPIST

## 2022-07-05 ENCOUNTER — APPOINTMENT (OUTPATIENT)
Dept: PHYSICAL THERAPY | Facility: HOSPITAL | Age: 37
End: 2022-07-05
Attending: PHYSICAL MEDICINE & REHABILITATION
Payer: COMMERCIAL

## 2022-07-07 ENCOUNTER — TELEPHONE (OUTPATIENT)
Dept: PHYSICAL THERAPY | Facility: HOSPITAL | Age: 37
End: 2022-07-07

## 2022-07-15 ENCOUNTER — TELEPHONE (OUTPATIENT)
Dept: PHYSICAL THERAPY | Facility: HOSPITAL | Age: 37
End: 2022-07-15

## 2022-07-19 ENCOUNTER — OFFICE VISIT (OUTPATIENT)
Dept: PHYSICAL THERAPY | Facility: HOSPITAL | Age: 37
End: 2022-07-19
Attending: INTERNAL MEDICINE
Payer: COMMERCIAL

## 2022-07-19 PROCEDURE — 97140 MANUAL THERAPY 1/> REGIONS: CPT | Performed by: PHYSICAL THERAPIST

## 2022-07-19 PROCEDURE — 97110 THERAPEUTIC EXERCISES: CPT | Performed by: PHYSICAL THERAPIST

## 2022-08-23 ENCOUNTER — OFFICE VISIT (OUTPATIENT)
Dept: PHYSICAL THERAPY | Facility: HOSPITAL | Age: 37
End: 2022-08-23
Attending: INTERNAL MEDICINE
Payer: COMMERCIAL

## 2022-08-23 PROCEDURE — 97112 NEUROMUSCULAR REEDUCATION: CPT | Performed by: PHYSICAL THERAPIST

## 2022-08-23 PROCEDURE — 97140 MANUAL THERAPY 1/> REGIONS: CPT | Performed by: PHYSICAL THERAPIST

## 2022-08-23 PROCEDURE — 97110 THERAPEUTIC EXERCISES: CPT | Performed by: PHYSICAL THERAPIST

## 2022-09-20 ENCOUNTER — OFFICE VISIT (OUTPATIENT)
Dept: PHYSICAL THERAPY | Facility: HOSPITAL | Age: 37
End: 2022-09-20
Attending: INTERNAL MEDICINE

## 2022-09-20 PROCEDURE — 97140 MANUAL THERAPY 1/> REGIONS: CPT | Performed by: PHYSICAL THERAPIST

## 2022-09-20 PROCEDURE — 97110 THERAPEUTIC EXERCISES: CPT | Performed by: PHYSICAL THERAPIST

## 2022-10-06 ENCOUNTER — OFFICE VISIT (OUTPATIENT)
Dept: PHYSICAL THERAPY | Facility: HOSPITAL | Age: 37
End: 2022-10-06
Attending: INTERNAL MEDICINE
Payer: COMMERCIAL

## 2022-10-06 PROCEDURE — 97110 THERAPEUTIC EXERCISES: CPT | Performed by: PHYSICAL THERAPIST

## 2022-10-06 PROCEDURE — 97140 MANUAL THERAPY 1/> REGIONS: CPT | Performed by: PHYSICAL THERAPIST

## 2022-10-14 ENCOUNTER — OFFICE VISIT (OUTPATIENT)
Dept: PHYSICAL THERAPY | Facility: HOSPITAL | Age: 37
End: 2022-10-14
Attending: INTERNAL MEDICINE
Payer: COMMERCIAL

## 2022-10-14 PROCEDURE — 97140 MANUAL THERAPY 1/> REGIONS: CPT | Performed by: PHYSICAL THERAPIST

## 2022-10-14 PROCEDURE — 97112 NEUROMUSCULAR REEDUCATION: CPT | Performed by: PHYSICAL THERAPIST

## 2022-10-28 ENCOUNTER — APPOINTMENT (OUTPATIENT)
Dept: PHYSICAL THERAPY | Facility: HOSPITAL | Age: 37
End: 2022-10-28
Attending: INTERNAL MEDICINE
Payer: COMMERCIAL

## 2022-11-03 ENCOUNTER — APPOINTMENT (OUTPATIENT)
Dept: PHYSICAL THERAPY | Facility: HOSPITAL | Age: 37
End: 2022-11-03
Attending: INTERNAL MEDICINE
Payer: COMMERCIAL

## 2022-11-15 ENCOUNTER — OFFICE VISIT (OUTPATIENT)
Dept: OPHTHALMOLOGY | Facility: CLINIC | Age: 37
End: 2022-11-15
Payer: COMMERCIAL

## 2022-11-15 DIAGNOSIS — H52.02 HYPEROPIA, LEFT: ICD-10-CM

## 2022-11-15 DIAGNOSIS — H52.11 MYOPIA, RIGHT: Primary | ICD-10-CM

## 2022-11-15 PROBLEM — Z78.9: Status: RESOLVED | Noted: 2022-11-15 | Resolved: 2022-11-15

## 2022-11-15 PROBLEM — Z78.9: Status: ACTIVE | Noted: 2022-11-15

## 2022-11-15 PROCEDURE — 92004 COMPRE OPH EXAM NEW PT 1/>: CPT | Performed by: OPHTHALMOLOGY

## 2022-12-01 ENCOUNTER — APPOINTMENT (OUTPATIENT)
Dept: PHYSICAL THERAPY | Facility: HOSPITAL | Age: 37
End: 2022-12-01
Attending: INTERNAL MEDICINE
Payer: COMMERCIAL

## 2023-06-29 ENCOUNTER — OFFICE VISIT (OUTPATIENT)
Dept: PHYSICAL MEDICINE AND REHAB | Facility: CLINIC | Age: 38
End: 2023-06-29
Payer: COMMERCIAL

## 2023-06-29 VITALS — HEART RATE: 83 BPM | BODY MASS INDEX: 26 KG/M2 | WEIGHT: 148 LBS | OXYGEN SATURATION: 99 %

## 2023-06-29 DIAGNOSIS — M22.2X9 PATELLOFEMORAL PAIN SYNDROME, UNSPECIFIED LATERALITY: Primary | ICD-10-CM

## 2023-06-29 DIAGNOSIS — M23.203 OLD PERIPHERAL TEAR OF MEDIAL MENISCUS OF RIGHT KNEE: ICD-10-CM

## 2023-06-29 PROCEDURE — 99213 OFFICE O/P EST LOW 20 MIN: CPT | Performed by: PHYSICAL MEDICINE & REHABILITATION

## 2023-06-29 RX ORDER — CETIRIZINE HYDROCHLORIDE 10 MG/1
TABLET ORAL
COMMUNITY

## 2023-06-29 RX ORDER — PHENTERMINE AND TOPIRAMATE 15; 92 MG/1; MG/1
CAPSULE, EXTENDED RELEASE ORAL DAILY
COMMUNITY
Start: 2023-03-28

## 2023-07-03 ENCOUNTER — TELEPHONE (OUTPATIENT)
Dept: PHYSICAL THERAPY | Facility: HOSPITAL | Age: 38
End: 2023-07-03

## 2023-07-06 ENCOUNTER — OFFICE VISIT (OUTPATIENT)
Dept: PHYSICAL THERAPY | Facility: HOSPITAL | Age: 38
End: 2023-07-06
Attending: PHYSICAL MEDICINE & REHABILITATION
Payer: COMMERCIAL

## 2023-07-06 DIAGNOSIS — M23.203 OLD PERIPHERAL TEAR OF MEDIAL MENISCUS OF RIGHT KNEE: ICD-10-CM

## 2023-07-06 DIAGNOSIS — M22.2X9 PATELLOFEMORAL PAIN SYNDROME, UNSPECIFIED LATERALITY: Primary | ICD-10-CM

## 2023-07-06 PROCEDURE — 97161 PT EVAL LOW COMPLEX 20 MIN: CPT | Performed by: PHYSICAL THERAPIST

## 2023-07-06 PROCEDURE — 97112 NEUROMUSCULAR REEDUCATION: CPT | Performed by: PHYSICAL THERAPIST

## 2023-07-06 PROCEDURE — 97140 MANUAL THERAPY 1/> REGIONS: CPT | Performed by: PHYSICAL THERAPIST

## 2023-07-18 ENCOUNTER — OFFICE VISIT (OUTPATIENT)
Dept: PHYSICAL THERAPY | Facility: HOSPITAL | Age: 38
End: 2023-07-18
Attending: PHYSICAL MEDICINE & REHABILITATION
Payer: COMMERCIAL

## 2023-07-18 PROCEDURE — 97112 NEUROMUSCULAR REEDUCATION: CPT | Performed by: PHYSICAL THERAPIST

## 2023-07-18 PROCEDURE — 97140 MANUAL THERAPY 1/> REGIONS: CPT | Performed by: PHYSICAL THERAPIST

## 2023-07-18 NOTE — PROGRESS NOTES
7/18/2023  Dx: Patellofemoral pain syndrome, unspecified laterality (M22.2X9)  Old peripheral tear of medial meniscus of right knee (M23.203)              Authorized # of Visits:  12 visits on the POC          Next MD visit: none   Fall Risk: standard         Precautions:  general  Medication Changes since last visit?: No    Subjective: Feeling 50% better. Was able to go to [employer] classes that last two week. Pain Rating:    Objective:      7/18/2023  Visit #   2   Manual Therapy Patella mobs    Superficial and deep lateral retinacular stretch in SL    Knee extension mobs in supine    STM R ITB     Therapeutic Exercise    Therapeutic Activity    Neuromuscular Education Kinesotaping R knee   TNE Education    HEP Continue current HEP         Assessment: No adverse effects to treatment. Goals: The pt will report a 50% decrease in pain. The pt will be able to ascend/descend a flight of stairs step over step method without a railing  The pt will be able to perform a full squat without an increase in symptoms. The pt will be able to complete a [place] class without an increase in symptoms. The pt will display full R knee ROM (0 - 140 degrees)    Frequency / Duration: Patient will be seen for 1 x/week or a total of 12 visits over a 90 day period. Treatment will include: Manual Therapy; Therapeutic Exercises; Neuromuscular Re-education;  Therapeutic Activity; Gait Training; Electrical Stim; Patient education; Home exercise program instruction; taping    Education or treatment limitation: None  Rehab Potential: good      Certification From: 0/0/5257  To:10/4/2023      Charges: Man2 (23), NM1 (10)      Total Timed Treatment: 33 min  Total Treatment Time: 33 min            FOR REFERENCE ONLY  Referring Physician: Dr. Mihai Hughes  Diagnosis:  Patellofemoral pain syndrome, unspecified laterality (M22.2X9)  Old peripheral tear of medial meniscus of right knee (M23.203)      Date of Onset: flare up 2 weeks ago Date of Service: 7/6/2023     PATIENT SUMMARY:   Nathen Lane is a 40year old y/o female who presents to therapy today with complaints of R knee pain and limited ROM. States she can't straighten her knee fully and she feels like her knee is swollen. State prior to this flare up she did well doing her HEP and [place] classes. Reports she had been doing a [place] class 3 times per week and felt pretty good for 6 months (Dec through April). States the last month her knee locked up and got swollen and had to use her brace/Alieve. By the time she got to Dr. Kimberly Peterson it was 50% better and therefore she didn't get an injection. States the manual therapy and taping has helped in the past.  Wearing [employer] shoes. Pt describes pain level  2-5/10 VAS. History of current condition: insidious onset of increased pain  Current functional limitations include limited ability to walk without her brace, go up and down stairs, participate in workouts, squat to  her young child. .   Roman Hernandez describes prior level of function independent in all activities. Was working out 3-5 days per week. . Pt goals include decrease pain. Past medical history was reviewed with Gopi. Significant findings include    No past medical history on file. 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 She describes an insidious onset of increased pain and swelling that limited her ability to full flex or extend her knee. Functional limitation include limited ability to perform a full squat, ascend/descend stairs, participate in workouts, and  her young child. She displays limited end range flexion and extension ROM, increased pain, TTP of the distal ITB, poor patella tracking.     Roman Hernandez would benefit from skilled Physical Therapy to address the above impairments and return to her PLOF.     Precautions:  None     OBJECTIVE:   Observation: slight edema in the R knee  Gait: decreased knee extension during late stance phase  Palpation: TTP over the R ITB  Sensation: intact to light touch    AROM:  Knee   Flexion: R 130; L 140  Extension: R -5; L +5          Accessory motion: decreased posterior glide R knee    Flexibility:   Hip Flexor: R short, L WFL  Hamstrings: R long; L long  Quads: R short; L WFL    Strength/MMT:   Hip Knee Foot/Ankle   Flexion: R 5/5; L 5/5  Extension: R 4+/5; L 5/5   Flexion: R 5/5; L 4+/5  Extension: R 5/5; L 5/5    DF: R 5/5; L 5/5  PF: R 5/5; L 5/5

## 2023-08-03 ENCOUNTER — APPOINTMENT (OUTPATIENT)
Dept: PHYSICAL THERAPY | Facility: HOSPITAL | Age: 38
End: 2023-08-03
Attending: PHYSICAL MEDICINE & REHABILITATION
Payer: COMMERCIAL

## 2023-08-10 ENCOUNTER — OFFICE VISIT (OUTPATIENT)
Dept: PHYSICAL THERAPY | Facility: HOSPITAL | Age: 38
End: 2023-08-10
Attending: PHYSICAL MEDICINE & REHABILITATION
Payer: COMMERCIAL

## 2023-08-10 PROCEDURE — 97140 MANUAL THERAPY 1/> REGIONS: CPT | Performed by: PHYSICAL THERAPIST

## 2023-08-10 PROCEDURE — 97110 THERAPEUTIC EXERCISES: CPT | Performed by: PHYSICAL THERAPIST

## 2023-08-10 PROCEDURE — 97112 NEUROMUSCULAR REEDUCATION: CPT | Performed by: PHYSICAL THERAPIST

## 2023-08-10 NOTE — PROGRESS NOTES
8/10/2023    Dx: Patellofemoral pain syndrome, unspecified laterality (M22.2X9)  Old peripheral tear of medial meniscus of right knee (M23.203)              Authorized # of Visits:  12 visits on the POC          Next MD visit: none   Fall Risk: standard         Precautions:  general  Medication Changes since last visit?: No    Subjective: Reports her knee swelled up again since her last PT visit. States she has been wearing more supportive shoes both inside her home and outside. States she is doing her [place] class 3 times a week and feels stronger. Hasn't been doing her [facility] TKE's and feels those helped in the past.      Pain Ratin-5/10 VAS    Objective:      2023  Visit #   2 8/10/2023  Visit #3   Manual Therapy Patella mobs    Superficial and deep lateral retinacular stretch in SL    Knee extension mobs in supine    STM R ITB   Patella mobs    Superficial and deep lateral retinacular stretch in SL    Knee extension mobs in supine    STM R ITB   Therapeutic Exercise  Re-start CK[facility] TKE's    Re-start SLR in sitting   Therapeutic Activity     Neuromuscular Education Kinesotaping R knee Left [place] Supported Right Glute Max   with Right Hip Extension and Right FA ER    Paraspinal release with L hamstring   TNE Education     HEP Continue current HEP eft [place] Supported Right Glute Max   with Right Hip Extension and Right FA ER    Paraspinal release with L hamstring    Re-start [facility] TKE's    Re-start SLR in sitting           Assessment: No adverse effects to treatment. The pt continue to respond well to manual therapy in the clinic but often has increases in pain/swelling between sessions. Now able to achieve full hyperextension after manual therapy. The pt is also wearing supportive shoe wear throughout her day. She does display a L (+) ADT test and and (+) PART and therefore was given LUIS ALFREDO activities to address. Will re-assess [facility] exam next visit and then prescribe activities accordingly.       Goals: The pt will report a 50% decrease in pain. The pt will be able to ascend/descend a flight of stairs step over step method without a railing  The pt will be able to perform a full squat without an increase in symptoms. The pt will be able to complete a [place] class without an increase in symptoms. The pt will display full R knee ROM (0 - 140 degrees)    Frequency / Duration: Patient will be seen for 1 x/week or a total of 12 visits over a 90 day period. Treatment will include: Manual Therapy; Therapeutic Exercises; Neuromuscular Re-education; Therapeutic Activity; Gait Training; Electrical Stim; Patient education; Home exercise program instruction; taping    Education or treatment limitation: None  Rehab Potential: good      Certification From: 3/2/2504  To:10/4/2023      Charges: Man2 (23),TE1 (8), NM1 (15)      Total Timed Treatment: 46 min  Total Treatment Time: 55 min            FOR REFERENCE ONLY  Referring Physician: Dr. Marco Antonio Eagle  Diagnosis:  Patellofemoral pain syndrome, unspecified laterality (M22.2X9)  Old peripheral tear of medial meniscus of right knee (M23.203)      Date of Onset: flare up 2 weeks ago Date of Service: 7/6/2023     PATIENT SUMMARY:   Dorothy High is a 40year old y/o female who presents to therapy today with complaints of R knee pain and limited ROM. States she can't straighten her knee fully and she feels like her knee is swollen. State prior to this flare up she did well doing her HEP and [place] classes. Reports she had been doing a [place] class 3 times per week and felt pretty good for 6 months (Dec through April). States the last month her knee locked up and got swollen and had to use her brace/Alieve. By the time she got to Dr. Marco Antonio Eagle it was 50% better and therefore she didn't get an injection. States the manual therapy and taping has helped in the past.  Wearing [employer] shoes. Pt describes pain level  2-5/10 VAS.    History of current condition: insidious onset of increased pain  Current functional limitations include limited ability to walk without her brace, go up and down stairs, participate in workouts, squat to  her young child. .   Palomo Gamboa describes prior level of function independent in all activities. Was working out 3-5 days per week. . Pt goals include decrease pain. Past medical history was reviewed with Gopi. Significant findings include    No past medical history on file. 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 She describes an insidious onset of increased pain and swelling that limited her ability to full flex or extend her knee. Functional limitation include limited ability to perform a full squat, ascend/descend stairs, participate in workouts, and  her young child. She displays limited end range flexion and extension ROM, increased pain, TTP of the distal ITB, poor patella tracking. Palomo Gamboa would benefit from skilled Physical Therapy to address the above impairments and return to her PLOF.     Precautions:  None     OBJECTIVE:   Observation: slight edema in the R knee  Gait: decreased knee extension during late stance phase  Palpation: TTP over the R ITB  Sensation: intact to light touch    AROM:  Knee   Flexion: R 130; L 140  Extension: R -5; L +5          Accessory motion: decreased posterior glide R knee    Flexibility:   Hip Flexor: R short, L WFL  Hamstrings: R long; L long  Quads: R short; L WFL    Strength/MMT:   Hip Knee Foot/Ankle   Flexion: R 5/5; L 5/5  Extension: R 4+/5; L 5/5   Flexion: R 5/5; L 4+/5  Extension: R 5/5; L 5/5    DF: R 5/5; L 5/5  PF: R 5/5; L 5/5

## 2023-08-17 ENCOUNTER — APPOINTMENT (OUTPATIENT)
Dept: PHYSICAL THERAPY | Facility: HOSPITAL | Age: 38
End: 2023-08-17
Attending: PHYSICAL MEDICINE & REHABILITATION
Payer: COMMERCIAL

## 2023-08-24 ENCOUNTER — APPOINTMENT (OUTPATIENT)
Dept: PHYSICAL THERAPY | Facility: HOSPITAL | Age: 38
End: 2023-08-24
Attending: PHYSICAL MEDICINE & REHABILITATION
Payer: COMMERCIAL

## 2023-08-31 ENCOUNTER — OFFICE VISIT (OUTPATIENT)
Dept: PHYSICAL THERAPY | Facility: HOSPITAL | Age: 38
End: 2023-08-31
Attending: PHYSICAL MEDICINE & REHABILITATION
Payer: COMMERCIAL

## 2023-08-31 PROCEDURE — 97530 THERAPEUTIC ACTIVITIES: CPT | Performed by: PHYSICAL THERAPIST

## 2023-08-31 PROCEDURE — 97140 MANUAL THERAPY 1/> REGIONS: CPT | Performed by: PHYSICAL THERAPIST

## 2023-09-07 ENCOUNTER — APPOINTMENT (OUTPATIENT)
Dept: PHYSICAL THERAPY | Facility: HOSPITAL | Age: 38
End: 2023-09-07
Attending: PHYSICAL MEDICINE & REHABILITATION
Payer: COMMERCIAL

## 2024-02-08 ENCOUNTER — ORDER TRANSCRIPTION (OUTPATIENT)
Dept: PHYSICAL THERAPY | Facility: HOSPITAL | Age: 39
End: 2024-02-08

## 2024-02-08 DIAGNOSIS — Z98.890 STATUS POST SURGICAL REMOVAL OF GANGLION CYST: Primary | ICD-10-CM

## 2024-02-09 ENCOUNTER — TELEPHONE (OUTPATIENT)
Dept: PHYSICAL THERAPY | Facility: HOSPITAL | Age: 39
End: 2024-02-09

## 2024-02-13 ENCOUNTER — OFFICE VISIT (OUTPATIENT)
Dept: PHYSICAL THERAPY | Facility: HOSPITAL | Age: 39
End: 2024-02-13
Attending: ORTHOPAEDIC SURGERY
Payer: COMMERCIAL

## 2024-02-13 DIAGNOSIS — Z98.890 STATUS POST SURGICAL REMOVAL OF GANGLION CYST: Primary | ICD-10-CM

## 2024-02-13 PROCEDURE — 97162 PT EVAL MOD COMPLEX 30 MIN: CPT | Performed by: PHYSICAL THERAPIST

## 2024-02-13 PROCEDURE — 97110 THERAPEUTIC EXERCISES: CPT | Performed by: PHYSICAL THERAPIST

## 2024-02-13 PROCEDURE — 97140 MANUAL THERAPY 1/> REGIONS: CPT | Performed by: PHYSICAL THERAPIST

## 2024-02-13 NOTE — PROGRESS NOTES
POST-OP KNEE EVALUATION:   Referring Physician: Dr. Yu  Diagnosis: Status post surgical removal of ganglion cyst (Z98.890)      Date of Service: 2/13/2024     PATIENT SUMMARY:   Gopi Peck is a 38 year old y/o female who presents to therapy today s/p ganglion cyst on 1/23/24 with complaints of increased R knee swelling, lack of ROM and decreased strength.  Reports she had one PT session on 2/6 session and then had her knee drained secondary to excessive fluid.  States she has been on crutches since she had her knee drains and states she doesn't feel as strong as she did right after surgery.  States she also feels like she lost ROM and can't bend her knee enough to put on tennis shoes.    Pt describes pain level 4/10 VAS.  History of current condition: had intermittent swelling for several years which lead to the ganglion cyst removal  .  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   Gopi describes prior level of function independent in all activities.  States she was going to [place] class a couple of times a week. Pt goals include decrease swelling, improve ROM, return to work and her PLOF.    Past medical history was reviewed with Gopi. Significant findings include   Raynaud's   HSV on the R side of the face   No past medical history on file.  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  She hasn't been able to complete her full work schedule secondary to increased swelling and presents to the session with increased extra-articular fluid and warmth superior to the patella.  She also displays significant  decrease in flexion ROM and displays very poor ability to contract her R quadriceps. She responded very well to gentle edema massage, MFR with kinesiotaping for edema control along with activities to promote weight bearing and quad control.  She will benefit from skilled PT to return to her PLOF.    Precautions:   Raynaud's Recent HSV infection      OBJECTIVE:   Gait: decreased WB on the R LE with bilateral axillary crutches  Palpation: TTP nirmal-patella area  Observation/Skin Assessment: visible swelling    Edema: at knee jt line R: 39.5 cm,; 5 cm above jt line R: 41 cm, 5 cm below jt line R: 36 cm    Sensation: intact to light touch    AROM:   Knee    Flexion: R 85; L 145   Extension: R -5; L 0     Patella Mobility/Accessory motion: excessive movement of the patella secondary to fluid      Strength/MMT:  Knee   Flexion: R 3/5; L 5/5  Extension: R 2/5; L 5/5          Today’s Treatment and Response:     2/13/2024  Visit #   1   Manual Therapy Edema massage    MFR R knee   Therapeutic Exercise quad sets    eccentric SLR    Heel slides in sitting    kinesiotaping for swelling control   Therapeutic Activity wearing tennis shoes, increasing WB through the R LE,     taping precautions.   Neuromuscular Education    TNE Education    HEP quad sets    eccentric SLR    Heel slides in sitting       Charges: PT Eval, Man1 (15), TE1 (15), TA(5) Total Timed Treatment: 45 min Total Treatment Time: 35 min         PLAN OF CARE:    Goals:    Pt will improve knee extension ROM to 0 deg to allow proper heel strike during gait and terminal knee extension in stance  Pt will improve knee AROM flexion to >130 degrees to improve ability to perform ascend/descend stairs.  Pt will improve quad strength to 5/5 to ascend 1 flight of stairs reciprocally without UE assist   Pt will increase hip and knee strength to grossly 4+/5 to be able to get up and down from the floor safely  Pt will demonstrate increased hip ER/ABD strength to 4/5 to perform  stepping and squatting activities without excessive femoral IR/ADD  Pt will improve SLS to >30s to improve safety and independence with gait on uneven surfaces such as grass   Pt will be independent and compliant with comprehensive HEP to maintain progress achieved in PT    Frequency / Duration: Patient will be seen for 1-3 x/week or a total of 12 visits over a 90 day period.  Treatment will include: Manual Therapy; Therapeutic Exercises; Neuromuscular Re-education; Therapeutic Activity; Gait Training; Patient education; Home Exercise Program;     Education or treatment limitation: None  Rehab Potential: good    Patient was advised of these findings, precautions, and treatment options and has agreed to actively participate in planning and for this course of care.    Thank you for your referral. Please co-sign or sign and return this letter via fax as soon as possible to [phone]. If you have any questions, please contact me at Dept: [phone]    Sincerely,  Electronically signed by therapist: CARMELA FREED, PT    Physician's certification required: Yes  I certify the need for these services furnished under this plan of treatment and while under my care.      X______________________________________________ Date________________  Certification From: 2/13/2024      To: 5/13/2024

## 2024-02-15 ENCOUNTER — OFFICE VISIT (OUTPATIENT)
Dept: PHYSICAL THERAPY | Facility: HOSPITAL | Age: 39
End: 2024-02-15
Attending: ORTHOPAEDIC SURGERY
Payer: COMMERCIAL

## 2024-02-15 PROCEDURE — 97140 MANUAL THERAPY 1/> REGIONS: CPT | Performed by: PHYSICAL THERAPIST

## 2024-02-15 PROCEDURE — 97110 THERAPEUTIC EXERCISES: CPT | Performed by: PHYSICAL THERAPIST

## 2024-02-15 NOTE — PROGRESS NOTES
2/15/2024  Dx: Status post surgical removal of ganglion cyst (Z98.890)             Authorized # of Visits:  12 visits on the POC          Next MD visit: none   Fall Risk: standard         Precautions:  Raynaud's, Shingles  Medication Changes since last visit?: Yes: Started Valtrex, PRN Oxycodone, Zofran,  Tamaflu    Subjective: Reports her knee is better.  States she can walk household distances without her crutches today and she was able to do a SLR this morning.  States everyone in her household has the flu.  States her current pain is minimal.    Pain Rating:  3/10 VAS    Objective:      2/13/2024  Visit #   1 2/15/2024  Visit #2   Manual Therapy Edema massage    MFR R knee Edema massage    MFR R knee    ROM R knee    Joint mobs - Grade I/II in sitting   Therapeutic Exercise quad sets    eccentric SLR    Heel slides in sitting    kinesiotaping for swelling control Quad sets    Eccentric SLR    Heel slides in sitting       Therapeutic Activity wearing tennis shoes, increasing WB through the R LE,     taping precautions.    Neuromuscular Education     TNE Education     HEP quad sets    eccentric SLR    Heel slides in sitting Continue current HEP         Assessment: No adverse effects to treatment.  The pt displayed less warmth in the supra-patellar region but not an increase in swelling after eccentric SLR's.  Also note decreased flexion ROM and increased swelling after gentle PROM.  The pt was able to weight bear through the R LE this date without axillary crutches.  The pt was advised to monitor swelling.    oals:    Pt will improve knee extension ROM to 0 deg to allow proper heel strike during gait and terminal knee extension in stance  Pt will improve knee AROM flexion to >130 degrees to improve ability to perform ascend/descend stairs.  Pt will improve quad strength to 5/5 to ascend 1 flight of stairs reciprocally without UE assist   Pt will increase hip and knee strength to grossly 4+/5 to be able to get up  and down from the floor safely  Pt will demonstrate increased hip ER/ABD strength to 4/5 to perform stepping and squatting activities without excessive femoral IR/ADD  Pt will improve SLS to >30s to improve safety and independence with gait on uneven surfaces such as grass   Pt will be independent and compliant with comprehensive HEP to maintain progress achieved in PT    Frequency / Duration: Patient will be seen for 1-3 x/week or a total of 12 visits over a 90 day period.  Treatment will include: Manual Therapy; Therapeutic Exercises; Neuromuscular Re-education; Therapeutic Activity; Gait Training; Patient education; Home Exercise Program;     Education or treatment limitation: None  Rehab Potential: good    Certification From: 2/13/2024      To: 5/13/2024        Charges: Man3 (38), TE1(10)       Total Timed Treatment: 48 min  Total Treatment Time: 48 min              FOR REFERENCE ONLY  Referring Physician: Dr. Yu  Diagnosis: Status post surgical removal of ganglion cyst (Z98.890)      Date of Service: 2/13/2024     PATIENT SUMMARY:   Gopi Peck is a 38 year old y/o female who presents to therapy today s/p ganglion cyst on 1/23/24 with complaints of increased R knee swelling, lack of ROM and decreased strength.  Reports she had one PT session on 2/6 session and then had her knee drained secondary to excessive fluid.  States she has been on crutches since she had her knee drains and states she doesn't feel as strong as she did right after surgery.  States she also feels like she lost ROM and can't bend her knee enough to put on tennis shoes.    Pt describes pain level 4/10 VAS.  History of current condition: had intermittent swelling for several years which lead to the ganglion cyst removal  .  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   Gopi describes prior level of function independent in all activities.  States  she was going to [place] class a couple of times a week. Pt goals include decrease swelling, improve ROM, return to work and her PLOF.    Past medical history was reviewed with Gopi. Significant findings include   Raynaud's   HSV on the R side of the face   No past medical history on file.  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  She hasn't been able to complete her full work schedule secondary to increased swelling and presents to the session with increased extra-articular fluid and warmth superior to the patella.  She also displays significant decrease in flexion ROM and displays very poor ability to contract her R quadriceps. She responded very well to gentle edema massage, MFR with kinesiotaping for edema control along with activities to promote weight bearing and quad control.  She will benefit from skilled PT to return to her PLOF.    Precautions:   Raynaud's Recent HSV infection      OBJECTIVE:   Gait: decreased WB on the R LE with bilateral axillary crutches  Palpation: TTP nirmal-patella area  Observation/Skin Assessment: visible swelling    Edema: at knee jt line R: 39.5 cm,; 5 cm above jt line R: 41 cm, 5 cm below jt line R: 36 cm    Sensation: intact to light touch    AROM:   Knee    Flexion: R 85; L 145   Extension: R -5; L 0     Patella Mobility/Accessory motion: excessive movement of the patella secondary to fluid      Strength/MMT:  Knee   Flexion: R 3/5; L 5/5  Extension: R 2/5; L 5/5

## 2024-02-19 ENCOUNTER — OFFICE VISIT (OUTPATIENT)
Dept: PHYSICAL THERAPY | Facility: HOSPITAL | Age: 39
End: 2024-02-19
Attending: ORTHOPAEDIC SURGERY
Payer: COMMERCIAL

## 2024-02-19 PROCEDURE — 97140 MANUAL THERAPY 1/> REGIONS: CPT | Performed by: PHYSICAL THERAPIST

## 2024-02-19 PROCEDURE — 97110 THERAPEUTIC EXERCISES: CPT | Performed by: PHYSICAL THERAPIST

## 2024-02-19 NOTE — PROGRESS NOTES
2/15/2024  Dx: Status post surgical removal of ganglion cyst (Z98.890)             Authorized # of Visits:  12 visits on the POC          Next MD visit: none   Fall Risk: standard         Precautions:  Raynaud's, Shingles  Medication Changes since last visit?: Yes: Started Valtrex, PRN Oxycodone, Zofran,  Tamaflu    Subjective: Reports her knee is better.  States she is walking household distances.  States her knee doesn't feel as tight in the morning.  States her quad sets and SLR's are going better but she still has problems bending her knee. States her shingles in improving but she still has pain in her face.  States she did not catch the flu that her family had.  Still has the tape on.    Pain Rating:  3/10 VAS    Objective:      2/13/2024  Visit #   1 2/15/2024  Visit #2 2/19/2024  Visit #3   Manual Therapy Edema massage    MFR R knee Edema massage    MFR R knee    ROM R knee    Joint mobs - Grade I/II in sitting Edema massage    MFR R knee    ROM R knee - with holds, minimal repetitions   Therapeutic Exercise quad sets    eccentric SLR    Heel slides in sitting    kinesiotaping for swelling control Quad sets    Eccentric SLR    Heel slides in sitting     CKC TKE    Heels slides in supine with therapist assist   Therapeutic Activity wearing tennis shoes, increasing WB through the R LE,     taping precautions.     Neuromuscular Education      TNE Education      HEP quad sets    eccentric SLR    Heel slides in sitting Continue current HEP CKC TKE    Heels slides in supine with therapist assist         Assessment: No adverse effects to treatment.  The pt displays less swelling this date and only slight warmth above the knee cap.  The pt continues to lack quad control with CKC TKE's but note R knee flexion is now approximately 100 degrees. Re-taped for edema control.      oals:    Pt will improve knee extension ROM to 0 deg to allow proper heel strike during gait and terminal knee extension in stance  Pt will  improve knee AROM flexion to >130 degrees to improve ability to perform ascend/descend stairs.  Pt will improve quad strength to 5/5 to ascend 1 flight of stairs reciprocally without UE assist   Pt will increase hip and knee strength to grossly 4+/5 to be able to get up and down from the floor safely  Pt will demonstrate increased hip ER/ABD strength to 4/5 to perform stepping and squatting activities without excessive femoral IR/ADD  Pt will improve SLS to >30s to improve safety and independence with gait on uneven surfaces such as grass   Pt will be independent and compliant with comprehensive HEP to maintain progress achieved in PT    Frequency / Duration: Patient will be seen for 1-3 x/week or a total of 12 visits over a 90 day period.  Treatment will include: Manual Therapy; Therapeutic Exercises; Neuromuscular Re-education; Therapeutic Activity; Gait Training; Patient education; Home Exercise Program;     Education or treatment limitation: None  Rehab Potential: good    Certification From: 2/13/2024      To: 5/13/2024        Charges: Man2 (23), TE1(15)       Total Timed Treatment: 38 min  Total Treatment Time: 38 min              FOR REFERENCE ONLY  Referring Physician: Dr. Yu  Diagnosis: Status post surgical removal of ganglion cyst (Z98.890)      Date of Service: 2/13/2024     PATIENT SUMMARY:   Gopi Peck is a 38 year old y/o female who presents to therapy today s/p ganglion cyst on 1/23/24 with complaints of increased R knee swelling, lack of ROM and decreased strength.  Reports she had one PT session on 2/6 session and then had her knee drained secondary to excessive fluid.  States she has been on crutches since she had her knee drains and states she doesn't feel as strong as she did right after surgery.  States she also feels like she lost ROM and can't bend her knee enough to put on tennis shoes.    Pt describes pain level 4/10 VAS.  History of current condition: had intermittent swelling for  several years which lead to the ganglion cyst removal  .  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   Gopi describes prior level of function independent in all activities.  States she was going to [place] class a couple of times a week. Pt goals include decrease swelling, improve ROM, return to work and her PLOF.    Past medical history was reviewed with Gopi. Significant findings include   Raynaud's   HSV on the R side of the face   No past medical history on file.  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  She hasn't been able to complete her full work schedule secondary to increased swelling and presents to the session with increased extra-articular fluid and warmth superior to the patella.  She also displays significant decrease in flexion ROM and displays very poor ability to contract her R quadriceps. She responded very well to gentle edema massage, MFR with kinesiotaping for edema control along with activities to promote weight bearing and quad control.  She will benefit from skilled PT to return to her PLOF.    Precautions:   Raynaud's Recent HSV infection      OBJECTIVE:   Gait: decreased WB on the R LE with bilateral axillary crutches  Palpation: TTP nirmal-patella area  Observation/Skin Assessment: visible swelling    Edema: at knee jt line R: 39.5 cm,; 5 cm above jt line R: 41 cm, 5 cm below jt line R: 36 cm    Sensation: intact to light touch    AROM:   Knee    Flexion: R 85; L 145   Extension: R -5; L 0     Patella Mobility/Accessory motion: excessive movement of the patella secondary to  fluid      Strength/MMT:  Knee   Flexion: R 3/5; L 5/5  Extension: R 2/5; L 5/5

## 2024-02-20 ENCOUNTER — OFFICE VISIT (OUTPATIENT)
Dept: PHYSICAL THERAPY | Facility: HOSPITAL | Age: 39
End: 2024-02-20
Attending: ORTHOPAEDIC SURGERY
Payer: COMMERCIAL

## 2024-02-20 PROCEDURE — 97110 THERAPEUTIC EXERCISES: CPT | Performed by: PHYSICAL THERAPIST

## 2024-02-20 PROCEDURE — 97140 MANUAL THERAPY 1/> REGIONS: CPT | Performed by: PHYSICAL THERAPIST

## 2024-02-20 NOTE — PROGRESS NOTES
2024  Patient Name: Gopi Peck  YOB: 1985          MRN number:  [id number]  Referring Physician:  Aeljandro Yu    Progress Summary    Dx: Status post surgical removal of ganglion cyst (Z98.890)             Authorized # of Visits:  12 visits on the POC          Next MD visit: none   Fall Risk: standard         Precautions:  Raynaud's, Shingles  Medication Changes since last visit?: Yes: Started Valtrex, PRN Oxycodone, Zofran,  Tamaflu    Subjective: Reports she was able to run errands today and felt pretty good.  States she was fatigued overall but her knee felt good.  States she did wear her brace.  Pain Ratin-3/10 VAS    Objective:     AROM:   Knee    Flexion: R 115; L 145   Extension: R 0; L 0         Strength/MMT:  Knee   Flexion: R 4/5; L 5/5  Extension: R 3+/5; L 5/5           2024  Visit #   1 2/15/2024  Visit #2 2024  Visit #3 2024  Visit #4   Manual Therapy Edema massage    MFR R knee Edema massage    MFR R knee    ROM R knee    Joint mobs - Grade I/II in sitting Edema massage    MFR R knee    ROM R knee - with holds, minimal repetitions Edema massage    MFR R knee    ROM R knee - with holds, minimal repetitions   Therapeutic Exercise quad sets    eccentric SLR    Heel slides in sitting    kinesiotaping for swelling control Quad sets    Eccentric SLR    Heel slides in sitting     CKC TKE    Heel slides in supine with therapist assist CKC TKE    Heel slides in supine with therapist assist       Therapeutic Activity wearing tennis shoes, increasing WB through the R LE,     taping precautions.      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  She now displays a 115 degrees of R knee flexion,  along with full extension.  She no longer walking with crutches for short community distance. She response well to kinesiotaping and edema massage and will benefit from continued skilled PT.  Recommend continued PT 2-3 times per week for up to 12 visits. The pt is in agreement with the POC.    oals:    Pt will improve knee extension ROM to 0 deg to allow proper heel strike during gait and terminal knee extension in stance  MET 2/20/2024  Pt will improve knee AROM flexion to >130 degrees  to improve ability to perform ascend/descend stairs. PROGRESSING 2/20/2024  Pt will improve quad strength to 5/5 to ascend 1 flight of stairs reciprocally without UE assist   PROGRESSING 2/20/2024  Pt will increase hip and knee strength to grossly 4+/5 to be able to get up and down from the floor safely PROGRESSING 2/20/2024  Pt will demonstrate increased hip ER/ABD strength to 4/5 to perform stepping and squatting activities without excessive femoral IR/ADD  PROGRESSING 2/20/2024  Pt will improve SLS to >30s to improve safety and independence with gait on uneven surfaces such as grass   PROGRESSING 2/20/2024  Pt will be independent and compliant with comprehensive HEP to maintain progress achieved in PT  MET 2/20/2024      Frequency / Duration: Patient will be seen for 1-3 x/week or a total of 12 visits over a 90 day period.  Treatment will include: Manual Therapy; Therapeutic Exercises; Neuromuscular Re-education; Therapeutic Activity; Gait Training; Patient education; Home Exercise Program;     Education or treatment limitation: None  Rehab Potential: good    Certification From: 2/13/2024      To: 5/13/2024        Charges: Man2 (23), TE1(15)       Total Timed Treatment: 38 min  Total Treatment Time: 38 min     Patient was advised of these findings, precautions, and treatment options and has agreed to actively participate in planning and for this course of care.    Thank you for your referral. Please co-sign or sign and return  this letter via fax as soon as possible to [phone]. If you have any questions, please contact me at Dept: [phone].    Sincerely,  CARMELA FREED PT    Electronically signed by therapist: CARMELA FREED PT    Physician's certification required: Yes  I certify the need for these services furnished under this plan of treatment and while under my care.    X___________________________________________________ Date____________________    Certification From: 2/21/2024  To:5/21/2024

## 2024-02-22 ENCOUNTER — OFFICE VISIT (OUTPATIENT)
Dept: PHYSICAL THERAPY | Facility: HOSPITAL | Age: 39
End: 2024-02-22
Attending: ORTHOPAEDIC SURGERY
Payer: COMMERCIAL

## 2024-02-22 PROCEDURE — 97110 THERAPEUTIC EXERCISES: CPT | Performed by: PHYSICAL THERAPIST

## 2024-02-22 PROCEDURE — 97140 MANUAL THERAPY 1/> REGIONS: CPT | Performed by: PHYSICAL THERAPIST

## 2024-02-22 NOTE — PROGRESS NOTES
2024    Patient Name: Gopi Peck  YOB: 1985          MRN number:  [id number]  Referring Physician:  Alejandro Yu    Dx: Status post surgical removal of ganglion cyst (Z98.890)             Authorized # of Visits:  12 visits on the POC          Next MD visit: none   Fall Risk: standard         Precautions:  Raynaud's, Shingles  Medication Changes since last visit?: Yes: Started Valtrex, PRN Oxycodone, Zofran,  Tamaflu    Subjective: Reports she worked a full day yesterday and states she could tell her leg was \"done\" by the end of the day.  States her R LE felt very fatigued and slightly swollen by the end of the day.  States she did wear her brace.  States her leg felt better when she was at home today.  Pain Ratin-3/10 VAS    Objective:     AROM:   Knee    Flexion: R 115; L 145   Extension: R 0; L 0         Strength/MMT:  Knee   Flexion: R 4/5; L 5/5  Extension: R 3+/5; L 5/5           2024  Visit #   1 2/15/2024  Visit #2 2024  Visit #3 2024  Visit #4 2024  Visit #5   Manual Therapy Edema massage    MFR R knee Edema massage    MFR R knee    ROM R knee    Joint mobs - Grade I/II in sitting Edema massage    MFR R knee    ROM R knee - with holds, minimal repetitions Edema massage    MFR R knee    ROM R knee - with holds, minimal repetitions Edema massage    MFR R knee   Therapeutic Exercise quad sets    eccentric SLR    Heel slides in sitting    kinesiotaping for swelling control Quad sets    Eccentric SLR    Heel slides in sitting     CKC TKE    Heel slides in supine with therapist assist CKC TKE    Heel slides in supine with therapist assist     Anterior step up 4 inch step with R LE    Sit to stands from chair with arms in front of her   Therapeutic Activity wearing tennis shoes, increasing WB through the R LE,     taping precautions.       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  She now displays a 115 degrees of R knee flexion, along with full extension.  She no longer walking with crutches for short community distance. She was able to progress her strengthening program with [facility] activities and did not display increased swelling from returning to work.  She response well to kinesiotaping and edema massage and will benefit from continued skilled PT.  Recommend continued PT 2-3 times per week for up to 12 visits. The pt is in agreement with the POC.    oals:    Pt will improve knee extension ROM to 0 deg to allow proper heel strike during gait and terminal knee extension in stance  MET 2/20/2024  Pt will improve knee AROM flexion to >130 degrees  to improve ability to perform ascend/descend stairs. PROGRESSING 2/20/2024  Pt will improve quad strength to 5/5 to ascend 1 flight of stairs reciprocally without UE assist   PROGRESSING 2/20/2024  Pt will increase hip and knee strength to grossly 4+/5 to be able to get up and down from the floor safely PROGRESSING 2/20/2024  Pt will demonstrate increased hip ER/ABD strength to 4/5 to perform stepping and squatting activities without excessive femoral IR/ADD  PROGRESSING 2/20/2024  Pt will improve SLS to >30s to improve safety and independence with gait on uneven surfaces such as grass   PROGRESSING 2/20/2024  Pt will be independent and compliant with comprehensive HEP to maintain progress achieved in PT  MET 2/20/2024      Frequency / Duration: Patient will be seen for 1-3 x/week or a total of 12 visits over a 90 day period.  Treatment will include: Manual Therapy; Therapeutic Exercises; Neuromuscular Re-education; Therapeutic Activity; Gait Training; Patient  education; Home Exercise Program;     Education or treatment limitation: None  Rehab Potential: good    Certification From: 2/13/2024      To: 5/13/2024        Charges: Man2 (23), TE2(23)       Total Timed Treatment: 46 min  Total Treatment Time: 46 min     Patient was advised of these findings, precautions, and treatment options and has agreed to actively participate in planning and for this course of care.    Thank you for your referral. Please co-sign or sign and return this letter via fax as soon as possible to [phone]. If you have any questions, please contact me at Dept: [phone].    Sincerely,  CARMELA FREED PT    Electronically signed by therapist: CARMELA FREED PT    Physician's certification required: Yes  I certify the need for these services furnished under this plan of treatment and while under my care.    X___________________________________________________ Date____________________    Certification From: 2/21/2024  To:5/21/2024

## 2024-02-23 ENCOUNTER — APPOINTMENT (OUTPATIENT)
Dept: PHYSICAL THERAPY | Facility: HOSPITAL | Age: 39
End: 2024-02-23
Attending: ORTHOPAEDIC SURGERY
Payer: COMMERCIAL

## 2024-02-27 ENCOUNTER — OFFICE VISIT (OUTPATIENT)
Dept: PHYSICAL THERAPY | Facility: HOSPITAL | Age: 39
End: 2024-02-27
Attending: ORTHOPAEDIC SURGERY
Payer: COMMERCIAL

## 2024-02-27 PROCEDURE — 97110 THERAPEUTIC EXERCISES: CPT | Performed by: PHYSICAL THERAPIST

## 2024-02-27 PROCEDURE — 97140 MANUAL THERAPY 1/> REGIONS: CPT | Performed by: PHYSICAL THERAPIST

## 2024-02-27 NOTE — PROGRESS NOTES
2024    Patient Name: Gopi Peck  YOB: 1985          MRN number:  [id number]  Referring Physician:  Alejandro Yu    Dx: Status post surgical removal of ganglion cyst (Z98.890)             Authorized # of Visits:  12 visits on the POC          Next MD visit: none   Fall Risk: standard         Precautions:  Raynaud's, Shingles  Medication Changes since last visit?: No    Subjective: Reports Dr. Yu wanted her to continue therapy and was happy that her knee wasn't swelling that much. States she is still fatigued and somewhat swollen by the end of her work day.  States she has to rest when she goes home.  Pain Ratin/10 VAS    Objective:     AROM:   Knee    Flexion: R 115; L 145   Extension: R 0; L 0         Strength/MMT:  Knee   Flexion: R 4/5; L 5/5  Extension: R 3+/5; L 5/5           2024  Visit #4 2024  Visit #5 2024  Visit #6   Manual Therapy Edema massage    MFR R knee    ROM R knee - with holds, minimal repetitions Edema massage    MFR R knee Edema massage    MFR R knee   Therapeutic Exercise CKC TKE    Heel slides in supine with therapist assist     Anterior step up 4 inch step with R LE    Sit to stands from chair with arms in front of her 3 way hip bilaterally    Lateral step downs 4 inch step    Anterior step down 2 inch step    Anterior step up  6 inch step           Therapeutic Activity      Neuromuscular Education Kineiotaping for edema     TNE Education      HEP Continue with current HEP  Anterior step up 4 inch step with R LE    Sit to stands from chair with arms in front of her 3 way hip bilaterally    Lateral step downs 4 inch step    Anterior step down 2 inch step    Anterior step up  6 inch step         Assessment: No adverse effects to treatment.  The pt displayed minimal swelling this date but has difficutly with eccentric control with anterior step downs.  Needs further quad control overall and was given an updated HEP.      Goals:    Pt will improve  knee extension ROM to 0 deg to allow proper heel strike during gait and terminal knee extension in stance  MET 2/20/2024  Pt will improve knee AROM flexion to >130 degrees  to improve ability to perform ascend/descend stairs. PROGRESSING 2/20/2024  Pt will improve quad strength to 5/5 to ascend 1 flight of stairs reciprocally without UE assist   PROGRESSING 2/20/2024  Pt will increase hip and knee strength to grossly 4+/5 to be able to get up and down from the floor safely PROGRESSING 2/20/2024  Pt will demonstrate increased hip ER/ABD strength to 4/5 to perform stepping and squatting activities without excessive femoral IR/ADD  PROGRESSING 2/20/2024  Pt will improve SLS to >30s to improve safety and independence with gait on uneven surfaces such as grass   PROGRESSING 2/20/2024  Pt will be independent and compliant with comprehensive HEP to maintain progress achieved in PT  MET 2/20/2024      Frequency / Duration: Patient will be seen for 1-3 x/week or a total of 12 visits over a 90 day period.  Treatment will include: Manual Therapy; Therapeutic Exercises; Neuromuscular Re-education; Therapeutic Activity; Gait Training; Patient education; Home Exercise Program;     Education or treatment limitation: None  Rehab Potential: good    Certification From: 2/13/2024      To: 5/13/2024        Charges: Man2 (23), TE2(23)       Total Timed Treatment: 46 min  Total Treatment Time: 46 min     Patient was advised of these findings, precautions, and treatment options and has agreed to actively participate in planning and for this course of care.    Thank you for your referral. Please co-sign or sign and return this letter via fax as soon as possible to [phone]. If you have any questions, please contact me at Dept: [phone].    Sincerely,  CARMELA FREED PT    Electronically signed by therapist: CARMELA FREED PT    Physician's certification required: Yes  I certify the need for these services furnished under this plan of  treatment and while under my care.    X___________________________________________________ Date____________________    Certification From: 2/21/2024  To:5/21/2024

## 2024-02-29 ENCOUNTER — OFFICE VISIT (OUTPATIENT)
Dept: PHYSICAL THERAPY | Facility: HOSPITAL | Age: 39
End: 2024-02-29
Attending: ORTHOPAEDIC SURGERY
Payer: COMMERCIAL

## 2024-02-29 PROCEDURE — 97112 NEUROMUSCULAR REEDUCATION: CPT | Performed by: PHYSICAL THERAPIST

## 2024-02-29 PROCEDURE — 97140 MANUAL THERAPY 1/> REGIONS: CPT | Performed by: PHYSICAL THERAPIST

## 2024-02-29 NOTE — PROGRESS NOTES
2024    Patient Name: Gopi Peck  YOB: 1985          MRN number:  [id number]  Referring Physician:  Alejandro Yu    Dx: Status post surgical removal of ganglion cyst (Z98.890)             Authorized # of Visits:  12 visits on the POC          Next MD visit: none   Fall Risk: standard         Precautions:  Raynaud's, Shingles  Medication Changes since last visit?: No    Subjective: Reports she well at work with the knee sleeve yesterday.  Report she was sore and achy after therapy at the last visit.  Stairs are still challenging to do one step at a time. Felt like she had a little bit of ITB irritation.  Pain Ratin/10 VAS    Objective:     AROM:   Knee    Flexion: R 115; L 145   Extension: R 0; L 0         Strength/MMT:  Knee   Flexion: R 4/5; L 5/5  Extension: R 3+/5; L 5/5           2024  Visit #4 2024  Visit #5 2024  Visit #6 2024  Visit #7   Manual Therapy Edema massage    MFR R knee    ROM R knee - with holds, minimal repetitions Edema massage    MFR R knee Edema massage    MFR R knee Edema massage    MFR R knee   Therapeutic Exercise CKC TKE    Heel slides in supine with therapist assist     Anterior step up 4 inch step with R LE    Sit to stands from chair with arms in front of her 3 way hip bilaterally    Lateral step downs 4 inch step    Anterior step down 2 inch step    Anterior step up  6 inch step         3 way hip bilaterally    Lateral step downs 4 inch step    Anterior step down 2 inch step    Anterior step up  6 inch step    Wall squats   Therapeutic Activity       Neuromuscular Education Kineiotaping for edema      TNE Education       HEP Continue with current HEP  Anterior step up 4 inch step with R LE    Sit to stands from chair with arms in front of her 3 way hip bilaterally    Lateral step downs 4 inch step    Anterior step down 2 inch step    Anterior step up  6 inch step Wall squats    Continue with current HEP         Assessment: No adverse  effects to treatment.  The pt reports continued improved overall.  Note improve control with anterior step ups and step downs.  Added wall squats this date but note slight increase in swelling after wall squats.        Goals:    Pt will improve knee extension ROM to 0 deg to allow proper heel strike during gait and terminal knee extension in stance  MET 2/20/2024  Pt will improve knee AROM flexion to >130 degrees  to improve ability to perform ascend/descend stairs. PROGRESSING 2/20/2024  Pt will improve quad strength to 5/5 to ascend 1 flight of stairs reciprocally without UE assist   PROGRESSING 2/20/2024  Pt will increase hip and knee strength to grossly 4+/5 to be able to get up and down from the floor safely PROGRESSING 2/20/2024  Pt will demonstrate increased hip ER/ABD strength to 4/5 to perform stepping and squatting activities without excessive femoral IR/ADD  PROGRESSING 2/20/2024  Pt will improve SLS to >30s to improve safety and independence with gait on uneven surfaces such as grass   PROGRESSING 2/20/2024  Pt will be independent and compliant with comprehensive HEP to maintain progress achieved in PT  MET 2/20/2024      Frequency / Duration: Patient will be seen for 1-3 x/week or a total of 12 visits over a 90 day period.  Treatment will include: Manual Therapy; Therapeutic Exercises; Neuromuscular Re-education; Therapeutic Activity; Gait Training; Patient education; Home Exercise Program;     Education or treatment limitation: None  Rehab Potential: good    Certification From: 2/13/2024      To: 5/13/2024        Charges: Man2 (23), TE2(23)       Total Timed Treatment: 46 min  Total Treatment Time: 46 min     Patient was advised of these findings, precautions, and treatment options and has agreed to actively participate in planning and for this course of care.    Thank you for your referral. Please co-sign or sign and return this letter via fax as soon as possible to [phone]. If you have any  questions, please contact me at Dept: [phone].    Sincerely,  CARMELA FREED PT    Electronically signed by therapist: CARMELA FREED PT    Physician's certification required: Yes  I certify the need for these services furnished under this plan of treatment and while under my care.    X___________________________________________________ Date____________________    Certification From: 2/21/2024  To:5/21/2024

## 2024-03-04 ENCOUNTER — OFFICE VISIT (OUTPATIENT)
Dept: PHYSICAL THERAPY | Facility: HOSPITAL | Age: 39
End: 2024-03-04
Attending: ORTHOPAEDIC SURGERY
Payer: COMMERCIAL

## 2024-03-04 PROCEDURE — 97530 THERAPEUTIC ACTIVITIES: CPT | Performed by: PHYSICAL THERAPIST

## 2024-03-04 PROCEDURE — 97140 MANUAL THERAPY 1/> REGIONS: CPT | Performed by: PHYSICAL THERAPIST

## 2024-03-04 PROCEDURE — 97110 THERAPEUTIC EXERCISES: CPT | Performed by: PHYSICAL THERAPIST

## 2024-03-04 PROCEDURE — 97112 NEUROMUSCULAR REEDUCATION: CPT | Performed by: PHYSICAL THERAPIST

## 2024-03-04 NOTE — PROGRESS NOTES
3/4/2024    Patient Name: Gopi Peck  YOB: 1985          MRN number:  [id number]  Referring Physician:  Alejandro Yu    Dx: Status post surgical removal of ganglion cyst (Z98.890)             Authorized # of Visits:  12 visits on the POC          Next MD visit: none   Fall Risk: standard         Precautions:  Raynaud's, Shingles  Medication Changes since last visit?: No    Subjective: Reports her knee is feeling better.  States she hasn't noticed any swelling and feels like stairs going up are getting better.  States she still has difficutly going down the stairs.  Reports she is experiencing increase Raynaud's symptoms in her feet and is working with her doctors on a treatment plan.  Pain Ratin/10 VAS    Objective:     AROM:   Knee    Flexion: R 115; L 145   Extension: R 0; L 0         Strength/MMT:  Knee   Flexion: R 4/5; L 5/5  Extension: R 3+/5; L 5/5           2024  Visit #7 3/4/2024  Visit #8   Manual Therapy Edema massage    MFR R knee Edema massage    MFR R knee   Therapeutic Exercise 3 way hip bilaterally    Lateral step downs 4 inch step    Anterior step down 2 inch step    Anterior step up  6 inch step    Wall squats Anterior step up 6 inch step    CKC TKE's against the wall    Anterior step down 4 inch step   Therapeutic Activity      Wall push ups   Chest   Triceps    Wall planks    For core stability    [place] at home - slow rate for 15 minutes or less   Neuromuscular Education  Step up and over Airex    3 way hip on Airex   TNE Education     HEP Wall squats    Continue with current HEP Wall push ups   Chest   Triceps    Wall planks    [place]         Assessment: No adverse effects to treatment.  The pt reports continued improved overall.  The pt continue to display minimal swelling in the R knee with minimal myofascial restrictions.  Continue to lack full eccentric control of the R knee.  The pt was given overall CKC activities to perform at home to improve stability and  advised to start the [place] with low resistance to increase her overall strength.  The pt will follow up MD's regarding her Raynaud's.      Goals:    Pt will improve knee extension ROM to 0 deg to allow proper heel strike during gait and terminal knee extension in stance  MET 2/20/2024  Pt will improve knee AROM flexion to >130 degrees  to improve ability to perform ascend/descend stairs. PROGRESSING 2/20/2024  Pt will improve quad strength to 5/5 to ascend 1 flight of stairs reciprocally without UE assist   PROGRESSING 2/20/2024  Pt will increase hip and knee strength to grossly 4+/5 to be able to get up and down from the floor safely PROGRESSING 2/20/2024  Pt will demonstrate increased hip ER/ABD strength to 4/5 to perform stepping and squatting activities without excessive femoral IR/ADD  PROGRESSING 2/20/2024  Pt will improve SLS to >30s to improve safety and independence with gait on uneven surfaces such as grass   PROGRESSING 2/20/2024  Pt will be independent and compliant with comprehensive HEP to maintain progress achieved in PT  MET 2/20/2024      Frequency / Duration: Patient will be seen for 1-3 x/week or a total of 12 visits over a 90 day period.  Treatment will include: Manual Therapy; Therapeutic Exercises; Neuromuscular Re-education; Therapeutic Activity; Gait Training; Patient education; Home Exercise Program;     Education or treatment limitation: None  Rehab Potential: good    Certification From: 2/13/2024      To: 5/13/2024        Charges: Man1 (10), TE1 (10), TA1 (10), NM1 (15)      Total Timed Treatment: 45 min  Total Treatment Time: 45 min     Patient was advised of these findings, precautions, and treatment options and has agreed to actively participate in planning and for this course of care.    Thank you for your referral. Please co-sign or sign and return this letter via fax as soon as possible to [phone]. If you have any questions, please contact me at Dept:  [phone].    Sincerely,  CARMELA FREED PT    Electronically signed by therapist: CARMELA FREED PT    Physician's certification required: Yes  I certify the need for these services furnished under this plan of treatment and while under my care.    X___________________________________________________ Date____________________    Certification From: 2/21/2024  To:5/21/2024

## 2024-03-07 ENCOUNTER — OFFICE VISIT (OUTPATIENT)
Dept: PHYSICAL THERAPY | Facility: HOSPITAL | Age: 39
End: 2024-03-07
Attending: ORTHOPAEDIC SURGERY
Payer: COMMERCIAL

## 2024-03-07 PROCEDURE — 97110 THERAPEUTIC EXERCISES: CPT | Performed by: PHYSICAL THERAPIST

## 2024-03-07 PROCEDURE — 97140 MANUAL THERAPY 1/> REGIONS: CPT | Performed by: PHYSICAL THERAPIST

## 2024-03-07 PROCEDURE — 97112 NEUROMUSCULAR REEDUCATION: CPT | Performed by: PHYSICAL THERAPIST

## 2024-03-07 NOTE — PROGRESS NOTES
3/7/2024    Patient Name: Gopi Peck  YOB: 1985          MRN number:  [id number]  Referring Physician:  Alejandro Yu    Dx: Status post surgical removal of ganglion cyst (Z98.890)             Authorized # of Visits:  12 visits on the POC          Next MD visit: none   Fall Risk: standard         Precautions:  Raynaud's, Shingles  Medication Changes since last visit?: No    Subjective: Reports her knee is feeling better.  Doesn't have any swelling after work anymore.  States she still feels like she needs some strength back.  Would like to return to workouts in the next couple of weeks.  Pain Ratin/10 VAS    Objective:     AROM:   Knee    Flexion: R 115; L 145   Extension: R 0; L 0         Strength/MMT:  Knee   Flexion: R 4/5; L 5/5  Extension: R 3+/5; L 5/5           2024  Visit #7 3/4/2024  Visit #8 3/7/2024  Visit #9   Manual Therapy Edema massage    MFR R knee Edema massage    MFR R knee Edema massage    MFR R knee   Therapeutic Exercise 3 way hip bilaterally    Lateral step downs 4 inch step    Anterior step down 2 inch step    Anterior step up  6 inch step    Wall squats Anterior step up 6 inch step    CKC TKE's against the wall    Anterior step down 4 inch step CKC TKE's with ball against wall    3 way hip in the Airex   Therapeutic Activity      Wall push ups   Chest   Triceps    Wall planks    For core stability    [place] at home - slow rate for 15 minutes or less    Neuromuscular Education  Step up and over Airex    3 way hip on Airex L SL, with R glut max    R glut max against the wall    Standing supported IO/TA   TNE Education      HEP Wall squats    Continue with current HEP Wall push ups   Chest   Triceps    Wall planks    [place] L SL, with R glut max    R glut max against the wall    Standing supported IO/TA         Assessment: No adverse effects to treatment.  No swelling this date.  Added NM Re-ed to improve L stance with R glut max activity as listed above.   Continued CKC strengthening.  Given an updated HEP.      Goals:    Pt will improve knee extension ROM to 0 deg to allow proper heel strike during gait and terminal knee extension in stance  MET 2/20/2024  Pt will improve knee AROM flexion to >130 degrees  to improve ability to perform ascend/descend stairs. PROGRESSING 2/20/2024  Pt will improve quad strength to 5/5 to ascend 1 flight of stairs reciprocally without UE assist   PROGRESSING 2/20/2024  Pt will increase hip and knee strength to grossly 4+/5 to be able to get up and down from the floor safely PROGRESSING 2/20/2024  Pt will demonstrate increased hip ER/ABD strength to 4/5 to perform stepping and squatting activities without excessive femoral IR/ADD  PROGRESSING 2/20/2024  Pt will improve SLS to >30s to improve safety and independence with gait on uneven surfaces such as grass   PROGRESSING 2/20/2024  Pt will be independent and compliant with comprehensive HEP to maintain progress achieved in PT  MET 2/20/2024      Frequency / Duration: Patient will be seen for 1-3 x/week or a total of 12 visits over a 90 day period.  Treatment will include: Manual Therapy; Therapeutic Exercises; Neuromuscular Re-education; Therapeutic Activity; Gait Training; Patient education; Home Exercise Program;     Education or treatment limitation: None  Rehab Potential: good    Certification From: 2/13/2024      To: 5/13/2024        Charges: Man1 (10), TE1 (8), NM2 (27)      Total Timed Treatment: 45 min  Total Treatment Time: 45 min     Patient was advised of these findings, precautions, and treatment options and has agreed to actively participate in planning and for this course of care.    Thank you for your referral. Please co-sign or sign and return this letter via fax as soon as possible to [phone]. If you have any questions, please contact me at Dept: [phone].    Sincerely,  CARMELA FREED PT    Electronically signed by therapist: CARMELA FREED PT    Physician's  certification required: Yes  I certify the need for these services furnished under this plan of treatment and while under my care.    X___________________________________________________ Date____________________    Certification From: 2/21/2024  To:5/21/2024

## 2024-03-12 ENCOUNTER — APPOINTMENT (OUTPATIENT)
Dept: PHYSICAL THERAPY | Facility: HOSPITAL | Age: 39
End: 2024-03-12
Attending: ORTHOPAEDIC SURGERY
Payer: COMMERCIAL

## 2024-03-14 ENCOUNTER — APPOINTMENT (OUTPATIENT)
Dept: PHYSICAL THERAPY | Facility: HOSPITAL | Age: 39
End: 2024-03-14
Attending: ORTHOPAEDIC SURGERY
Payer: COMMERCIAL

## 2024-03-21 ENCOUNTER — OFFICE VISIT (OUTPATIENT)
Dept: PHYSICAL THERAPY | Facility: HOSPITAL | Age: 39
End: 2024-03-21
Attending: ORTHOPAEDIC SURGERY
Payer: COMMERCIAL

## 2024-03-21 PROCEDURE — 97140 MANUAL THERAPY 1/> REGIONS: CPT | Performed by: PHYSICAL THERAPIST

## 2024-03-21 PROCEDURE — 97110 THERAPEUTIC EXERCISES: CPT | Performed by: PHYSICAL THERAPIST

## 2024-03-21 NOTE — PROGRESS NOTES
3/21/2024    Patient Name: Gopi Peck  YOB: 1985          MRN number:  [id number]  Referring Physician:  Alejandro Yu    Dx: Status post surgical removal of ganglion cyst (Z98.890)             Authorized # of Visits:  12 visits on the POC          Next MD visit: none   Fall Risk: standard         Precautions:  Raynaud's, Shingles  Medication Changes since last visit?: No    Subjective: Reports her knee is feeling better.  Can go down the stairs more easily.  States she is no longer wearing her brace at work.  Feels like she could return to low impact work out classes.  Pain Ratin/10 VAS    Objective:     AROM:   Knee    Flexion: R 115; L 145   Extension: R 0; L 0         Strength/MMT:  Knee   Flexion: R 4/5; L 5/5  Extension: R 3+/5; L 5/5           3/4/2024  Visit #8 3/7/2024  Visit #9 3/21/2024  Visit #10   Manual Therapy Edema massage    MFR R knee Edema massage    MFR R knee Edema massage    MFR R knee   Therapeutic Exercise Anterior step up 6 inch step    CKC TKE's against the wall    Anterior step down 4 inch step CKC TKE's with ball against wall    3 way hip in the Airex    Therapeutic Activity Wall push ups   Chest   Triceps    Wall planks    For core stability    [place] at home - slow rate for 15 minutes or less  Anterior step ups 8 inch    Anterior step downs on a 4 inch step    3 way hip on step    Step up and over Airex    Wall squats   Neuromuscular Education Step up and over Airex    3 way hip on Airex L SL, with R glut max    R glut max against the wall    Standing supported IO/TA    TNE Education      HEP Wall push ups   Chest   Triceps    Wall planks    [place] L SL, with R glut max    R glut max against the wall    Standing supported IO/TA Anterior step ups 8 inch    Anterior step downs on a 4 inch step    3 way hip on step    Step up and over Airex    Wall squats         Assessment: Dr. Gopi Peck has completed 10 visits and reports significant improvement.  She now  displays full L knee extension and 145 degrees of flexion.  She continues to lack eccentric control. She no longer has supra-patellar swelling and less pain.  She has been able to progress to CKC exercises without an increase in swelling.  She will benefit from skilled PT to continue to strength and return to her PLOF.  Recommend skilled PT 1 time per week to once every 2-3 weeks for up to 8 visits.  The pt is in agreement with the POC.    Goals:    Pt will improve knee extension ROM to 0 deg to allow proper heel strike during gait and terminal knee extension in stance  MET 2/20/2024  Pt will improve knee AROM flexion to >130 degrees  to improve ability to perform ascend/descend stairs. PROGRESSING 2/20/2024  Pt will improve quad strength to 5/5 to ascend 1 flight of stairs reciprocally without UE assist   PROGRESSING 2/20/2024  Pt will increase hip and knee strength to grossly 4+/5 to be able to get up and down from the floor safely PROGRESSING 2/20/2024  Pt will demonstrate increased hip ER/ABD strength to 4/5 to perform stepping and squatting activities without excessive femoral IR/ADD  PROGRESSING 2/20/2024  Pt will improve SLS to >30s to improve safety and independence with gait on uneven surfaces such as grass   PROGRESSING 2/20/2024  Pt will be independent and compliant with comprehensive HEP to maintain progress achieved in PT  MET 2/20/2024      Frequency / Duration: Patient will be seen for 1-3 x/week or a total of 12 visits over a 90 day period.  Treatment will include: Manual Therapy; Therapeutic Exercises; Neuromuscular Re-education; Therapeutic Activity; Gait Training; Patient education; Home Exercise Program;     Education or treatment limitation: None  Rehab Potential: good    Certification From: 2/13/2024      To: 5/13/2024        Charges: Man1 (8), TE3(38)     Total Timed Treatment: 48 min  Total Treatment Time: 48 min     Patient was advised of these findings, precautions, and treatment options  and has agreed to actively participate in planning and for this course of care.    Thank you for your referral. Please co-sign or sign and return this letter via fax as soon as possible to [phone]. If you have any questions, please contact me at Dept: [phone].    Sincerely,  CARMELA FREED PT    Electronically signed by therapist: CARMELA FREED PT    Physician's certification required: Yes  I certify the need for these services furnished under this plan of treatment and while under my care.    X___________________________________________________ Date____________________    Certification From: 2/21/2024  To:5/21/2024

## 2024-04-04 ENCOUNTER — OFFICE VISIT (OUTPATIENT)
Dept: PHYSICAL THERAPY | Facility: HOSPITAL | Age: 39
End: 2024-04-04
Attending: ORTHOPAEDIC SURGERY
Payer: COMMERCIAL

## 2024-04-04 PROCEDURE — 97140 MANUAL THERAPY 1/> REGIONS: CPT | Performed by: PHYSICAL THERAPIST

## 2024-04-04 PROCEDURE — 97110 THERAPEUTIC EXERCISES: CPT | Performed by: PHYSICAL THERAPIST

## 2024-04-04 NOTE — PROGRESS NOTES
2024    Patient Name: Gopi Peck  YOB: 1985          MRN number:  [id number]  Referring Physician:  Alejandro Yu    Dx: Status post surgical removal of ganglion cyst (Z98.890)             Authorized # of Visits:  12 visits on the POC          Next MD visit: none   Fall Risk: standard         Precautions:  Raynaud's, Shingles  Medication Changes since last visit?: No    Subjective: Reports she went on vacation and that irritated her knee.  Reports it isn't as bad as it previous was.  States she is going up and down the stairs, walking on beaches.  Reports she was more swollen and felt unstable.  Going to try a [place] class this week.  Reports going down the stairs is still challenge.    Pain Ratin/10 VAS    Objective:     AROM:   Knee    Flexion: R 115; L 145   Extension: R 0; L 0         Strength/MMT:  Knee   Flexion: R 4/5; L 5/5  Extension: R 3+/5; L 5/5           3/21/2024  Visit #10  2024  Visit #11     Manual Therapy Edema massage    MFR R knee Edema massage    MFR R knee   Therapeutic Exercise     Therapeutic Activity Anterior step ups 8 inch    Anterior step downs on a 4 inch step    3 way hip on step    Step up and over Airex    Wall squats Anterior step up    Anterior step down   Neuromuscular Education     TNE Education     HEP Anterior step ups 8 inch    Anterior step downs on a 4 inch step    3 way hip on step    Step up and over Airex    Wall squats Anterior step up    Anterior step down         Assessment: No adverse effects to treatment.  The pt displays increased edema this date.  The pt reported less pain after the session.  Advised to slowly resume her HEP.    Goals:    Pt will improve knee extension ROM to 0 deg to allow proper heel strike during gait and terminal knee extension in stance  MET 2024  Pt will improve knee AROM flexion to >130 degrees  to improve ability to perform ascend/descend stairs. PROGRESSING 2024  Pt will improve quad strength to 5/5  to ascend 1 flight of stairs reciprocally without UE assist   PROGRESSING 2/20/2024  Pt will increase hip and knee strength to grossly 4+/5 to be able to get up and down from the floor safely PROGRESSING 2/20/2024  Pt will demonstrate increased hip ER/ABD strength to 4/5 to perform stepping and squatting activities without excessive femoral IR/ADD  PROGRESSING 2/20/2024  Pt will improve SLS to >30s to improve safety and independence with gait on uneven surfaces such as grass   PROGRESSING 2/20/2024  Pt will be independent and compliant with comprehensive HEP to maintain progress achieved in PT  MET 2/20/2024      Frequency / Duration: Patient will be seen for 1-3 x/week or a total of 12 visits over a 90 day period.  Treatment will include: Manual Therapy; Therapeutic Exercises; Neuromuscular Re-education; Therapeutic Activity; Gait Training; Patient education; Home Exercise Program;     Education or treatment limitation: None  Rehab Potential: good    Certification From: 2/13/2024      To: 5/13/2024        Charges: TE1 (8), Man3(38)     Total Timed Treatment: 48 min  Total Treatment Time: 48 min     Patient was advised of these findings, precautions, and treatment options and has agreed to actively participate in planning and for this course of care.    Thank you for your referral. Please co-sign or sign and return this letter via fax as soon as possible to [phone]. If you have any questions, please contact me at Dept: [phone].    Sincerely,  CARMELA FREED PT    Electronically signed by therapist: CARMELA FREED PT    Physician's certification required: Yes  I certify the need for these services furnished under this plan of treatment and while under my care.    X___________________________________________________ Date____________________    Certification From: 2/21/2024  To:5/21/2024

## 2024-04-30 ENCOUNTER — APPOINTMENT (OUTPATIENT)
Dept: PHYSICAL THERAPY | Facility: HOSPITAL | Age: 39
End: 2024-04-30
Attending: ORTHOPAEDIC SURGERY
Payer: COMMERCIAL

## 2024-05-02 ENCOUNTER — APPOINTMENT (OUTPATIENT)
Dept: PHYSICAL THERAPY | Facility: HOSPITAL | Age: 39
End: 2024-05-02
Attending: ORTHOPAEDIC SURGERY
Payer: COMMERCIAL

## (undated) DIAGNOSIS — R42 VERTIGO: Primary | ICD-10-CM

## (undated) NOTE — ED AVS SNAPSHOT
Aaron Orantes   MRN: [id number]    Department:  [facility] Emergency Department   Date of Visit:  4/8/2019           Disclosure     Insurance plans vary and the physician(s) referred by the ER may not be covered by your plan.  Please contact your CARE PHYSICIAN AT ONCE OR RETURN IMMEDIATELY TO THE EMERGENCY DEPARTMENT. If you have been prescribed any medication(s), please fill your prescription right away and begin taking the medication(s) as directed.   If you believe that any of the medications